# Patient Record
Sex: FEMALE
[De-identification: names, ages, dates, MRNs, and addresses within clinical notes are randomized per-mention and may not be internally consistent; named-entity substitution may affect disease eponyms.]

---

## 2017-06-10 ENCOUNTER — HOSPITAL ENCOUNTER (EMERGENCY)
Dept: HOSPITAL 14 - H.ER | Age: 72
Discharge: HOME | End: 2017-06-10
Payer: MEDICARE

## 2017-06-10 VITALS
SYSTOLIC BLOOD PRESSURE: 154 MMHG | HEART RATE: 70 BPM | TEMPERATURE: 98.3 F | RESPIRATION RATE: 20 BRPM | OXYGEN SATURATION: 99 % | DIASTOLIC BLOOD PRESSURE: 83 MMHG

## 2017-06-10 VITALS — BODY MASS INDEX: 33.1 KG/M2

## 2017-06-10 DIAGNOSIS — Z77.098: ICD-10-CM

## 2017-06-10 DIAGNOSIS — J44.9: ICD-10-CM

## 2017-06-10 DIAGNOSIS — J45.909: ICD-10-CM

## 2017-06-10 DIAGNOSIS — I10: ICD-10-CM

## 2017-06-10 LAB
ALBUMIN/GLOB SERPL: 1.7 {RATIO} (ref 1–2.1)
ALP SERPL-CCNC: 67 U/L (ref 38–126)
ALT SERPL-CCNC: 29 U/L (ref 9–52)
AST SERPL-CCNC: 26 U/L (ref 14–36)
BASOPHILS # BLD AUTO: 0.1 K/UL (ref 0–0.2)
BASOPHILS NFR BLD: 1 % (ref 0–2)
BILIRUB SERPL-MCNC: 0.5 MG/DL (ref 0.2–1.3)
BUN SERPL-MCNC: 19 MG/DL (ref 7–17)
CALCIUM SERPL-MCNC: 9.5 MG/DL (ref 8.4–10.2)
CHLORIDE SERPL-SCNC: 109 MMOL/L (ref 98–107)
CO2 SERPL-SCNC: 23 MMOL/L (ref 22–30)
EOSINOPHIL # BLD AUTO: 0.1 K/UL (ref 0–0.7)
EOSINOPHIL NFR BLD: 2.6 % (ref 0–4)
ERYTHROCYTE [DISTWIDTH] IN BLOOD BY AUTOMATED COUNT: 12.9 % (ref 11.5–14.5)
GLOBULIN SER-MCNC: 2.7 GM/DL (ref 2.2–3.9)
GLUCOSE SERPL-MCNC: 100 MG/DL (ref 65–105)
HCT VFR BLD CALC: 43 % (ref 34–47)
LYMPHOCYTES # BLD AUTO: 1.3 K/UL (ref 1–4.3)
LYMPHOCYTES NFR BLD AUTO: 23.6 % (ref 20–40)
MCH RBC QN AUTO: 27.5 PG (ref 27–31)
MCHC RBC AUTO-ENTMCNC: 32.7 G/DL (ref 33–37)
MCV RBC AUTO: 84.2 FL (ref 81–99)
MONOCYTES # BLD: 0.5 K/UL (ref 0–0.8)
MONOCYTES NFR BLD: 9.1 % (ref 0–10)
NEUTROPHILS # BLD: 3.4 K/UL (ref 1.8–7)
NEUTROPHILS NFR BLD AUTO: 63.7 % (ref 50–75)
NRBC BLD AUTO-RTO: 0.1 % (ref 0–0)
PLATELET # BLD: 217 K/UL (ref 130–400)
PMV BLD AUTO: 9.2 FL (ref 7.2–11.7)
POTASSIUM SERPL-SCNC: 4.8 MMOL/L (ref 3.6–5)
PROT SERPL-MCNC: 7.2 G/DL (ref 6.3–8.2)
SODIUM SERPL-SCNC: 143 MMOL/L (ref 132–148)
WBC # BLD AUTO: 5.4 K/UL (ref 4.8–10.8)

## 2017-06-10 PROCEDURE — 71020: CPT

## 2017-06-10 PROCEDURE — 80053 COMPREHEN METABOLIC PANEL: CPT

## 2017-06-10 PROCEDURE — 85025 COMPLETE CBC W/AUTO DIFF WBC: CPT

## 2017-06-10 PROCEDURE — 99285 EMERGENCY DEPT VISIT HI MDM: CPT

## 2017-06-10 PROCEDURE — 96375 TX/PRO/DX INJ NEW DRUG ADDON: CPT

## 2017-06-10 PROCEDURE — 96374 THER/PROPH/DIAG INJ IV PUSH: CPT

## 2017-06-10 NOTE — RAD
HISTORY:

shortness of breath  



COMPARISON:

03/31/2015.



TECHNIQUE:

Chest PA and lateral



FINDINGS:



LUNGS:

Hyperinflation, manifestations of COPD. No active pulmonary disease.



PLEURA:

No significant pleural effusion identified. No pneumothorax apparent.



CARDIOVASCULAR:

Cardiomegaly.  No evidence of acute, significant cardiovascular 

disease. Incidental Finding(s): Postoperative changes related to 

sternotomy. 



OSSEOUS STRUCTURES:

No significant abnormalities.



VISUALIZED UPPER ABDOMEN:

Normal.



OTHER FINDINGS:

None.



IMPRESSION:

No active disease. No significant interval change compared to the 

prior examination(s).
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
## 2017-06-29 ENCOUNTER — HOSPITAL ENCOUNTER (INPATIENT)
Dept: HOSPITAL 14 - H.ER | Age: 72
LOS: 2 days | Discharge: HOME | DRG: 204 | End: 2017-07-01
Attending: INTERNAL MEDICINE | Admitting: INTERNAL MEDICINE
Payer: MEDICARE

## 2017-06-29 VITALS — BODY MASS INDEX: 33.1 KG/M2

## 2017-06-29 DIAGNOSIS — R91.8: ICD-10-CM

## 2017-06-29 DIAGNOSIS — F41.9: ICD-10-CM

## 2017-06-29 DIAGNOSIS — M79.602: ICD-10-CM

## 2017-06-29 DIAGNOSIS — Z99.81: ICD-10-CM

## 2017-06-29 DIAGNOSIS — Z87.891: ICD-10-CM

## 2017-06-29 DIAGNOSIS — I10: ICD-10-CM

## 2017-06-29 DIAGNOSIS — I25.10: ICD-10-CM

## 2017-06-29 DIAGNOSIS — R19.7: ICD-10-CM

## 2017-06-29 DIAGNOSIS — J45.909: ICD-10-CM

## 2017-06-29 DIAGNOSIS — I69.398: ICD-10-CM

## 2017-06-29 DIAGNOSIS — Z95.1: ICD-10-CM

## 2017-06-29 DIAGNOSIS — M81.0: ICD-10-CM

## 2017-06-29 DIAGNOSIS — J44.9: ICD-10-CM

## 2017-06-29 DIAGNOSIS — E78.5: ICD-10-CM

## 2017-06-29 DIAGNOSIS — R06.09: Primary | ICD-10-CM

## 2017-06-29 DIAGNOSIS — Z88.0: ICD-10-CM

## 2017-06-29 DIAGNOSIS — Z87.01: ICD-10-CM

## 2017-06-29 DIAGNOSIS — Z79.82: ICD-10-CM

## 2017-06-29 DIAGNOSIS — G89.29: ICD-10-CM

## 2017-06-29 DIAGNOSIS — E78.00: ICD-10-CM

## 2017-06-29 DIAGNOSIS — M79.605: ICD-10-CM

## 2017-06-29 LAB
APTT BLD: 22.2 SECONDS (ref 25.6–37.1)
ARTERIAL BLOOD GAS O2 SAT: 98.3 % (ref 95–98)
ARTERIAL BLOOD GAS PCO2: 31 MM/HG (ref 35–45)
ARTERIAL BLOOD GAS TCO2: 19.3 MMOL/L (ref 22–28)
ARTERIAL PATENCY WRIST A: YES
BASOPHILS # BLD AUTO: 0 K/UL (ref 0–0.2)
BASOPHILS NFR BLD: 0.2 % (ref 0–2)
BNP SERPL-MCNC: 223 PG/ML (ref 0–900)
BUN SERPL-MCNC: 21 MG/DL (ref 7–17)
CALCIUM SERPL-MCNC: 9 MG/DL (ref 8.4–10.2)
EOSINOPHIL # BLD AUTO: 0 K/UL (ref 0–0.7)
EOSINOPHIL NFR BLD: 0.2 % (ref 0–4)
ERYTHROCYTE [DISTWIDTH] IN BLOOD BY AUTOMATED COUNT: 12.8 % (ref 11.5–14.5)
GFR NON-AFRICAN AMERICAN: > 60
HCO3 BLDA-SCNC: 20.4 MMOL/L (ref 21–28)
HGB BLD-MCNC: 16.6 G/DL (ref 12–16)
INHALED O2 CONCENTRATION: 100 %
INR PPP: 0.9 (ref 0.9–1.2)
LYMPHOCYTES # BLD AUTO: 0.6 K/UL (ref 1–4.3)
LYMPHOCYTES NFR BLD AUTO: 17.5 % (ref 20–40)
MCH RBC QN AUTO: 27.7 PG (ref 27–31)
MCHC RBC AUTO-ENTMCNC: 32.7 G/DL (ref 33–37)
MCV RBC AUTO: 84.7 FL (ref 81–99)
MONOCYTES # BLD: 0 K/UL (ref 0–0.8)
MONOCYTES NFR BLD: 1 % (ref 0–10)
NEUTROPHILS # BLD: 2.5 K/UL (ref 1.8–7)
NEUTROPHILS NFR BLD AUTO: 81.1 % (ref 50–75)
NRBC BLD AUTO-RTO: 0.2 % (ref 0–0)
PH BLDA: 7.38 [PH] (ref 7.35–7.45)
PLATELET # BLD: 172 K/UL (ref 130–400)
PMV BLD AUTO: 9.2 FL (ref 7.2–11.7)
PO2 BLDA: 80 MM/HG (ref 80–100)
PROTHROMBIN TIME: 10.6 SECONDS (ref 9.8–13.1)
RBC # BLD AUTO: 6.01 MIL/UL (ref 3.8–5.2)
WBC # BLD AUTO: 3.1 K/UL (ref 4.8–10.8)

## 2017-06-29 PROCEDURE — 5A09357 ASSISTANCE WITH RESPIRATORY VENTILATION, LESS THAN 24 CONSECUTIVE HOURS, CONTINUOUS POSITIVE AIRWAY PRESSURE: ICD-10-PCS | Performed by: INTERNAL MEDICINE

## 2017-06-29 NOTE — ED PDOC
HPI: SOB/CHF/COPD


Time Seen by Provider: 17 20:58


Chief Complaint (Nursing): GI Problem


Chief Complaint (Provider): Difficulty Breathing, Chest Pain, Vomiting, Diarrhea


History Per: Patient


History/Exam Limitations: no limitations


Onset/Duration Of Symptoms: Hrs (2 hours prior to arrival)


Current Symptoms Are (Timing): Still Present


Associated Symptoms: Chest Pain


Additional Complaint(s): 


Ange Roe, a 71 year old female, who has a past medical history of 

Coronary artery bypass grafting,  Chronic obstructive pulmonary disease (on 

oxygen at home) presents to the ED with vomiting, chest pain, diarrhea and 

difficulty breathing. The patient reports that her symptoms started 2 hours 

prior to arrival. She states that she believes she is having a reaction to 

hydrochlorothiazide. The patient also states that she is on a water pill but 

does not know how many milligrams.








Past Medical History


Reviewed: Historical Data, Nursing Documentation, Vital Signs


Vital Signs: 


 Last Vital Signs











Temp  98.7 F   17 20:44


 


Pulse  122 H  17 22:04


 


Resp  26 H  17 22:03


 


BP  98/63 L  17 22:03


 


Pulse Ox  100   17 22:22














- Medical History


PMH: Anxiety, Asthma, CAD, CHF, COPD, CVA (x3), Hiatal Hernia, HTN, 

Hypercholesterolemia, Osteoporosis, Pneumonia


   Denies: Chronic Kidney Disease


Other PMH: CABG





- Surgical History


Surgical History: Appendectomy, CABG (TRIPLE BYPASS. )





- Family History


Family History: States: Unknown Family Hx





- Immunization History


Hx Tetanus Toxoid Vaccination: No


Hx Influenza Vaccination: No


Hx Pneumococcal Vaccination: No





- Home Medications


Home Medications: 


 Ambulatory Orders











 Medication  Instructions  Recorded


 


Metoprolol Succinate [Toprol XL] 50 mg PO HS 14


 


ALPRAZolam [Xanax] 1 mg PO BID PRN 16


 


Aspirin [Ecotrin] 81 mg PO HS 16


 


Cholecalciferol 400 Intl Units 400 unit PO DAILY 16





[Vitamin D 400 Intl Units Tab]  


 


Moxifloxacin HCl [Avelox] 400 mg PO DAILY #7 tablet 16


 


Oxycodone HCl/Acetaminophen 1 tab PO Q6H PRN 16





[Percocet  mg Tablet]  


 


Potassium Chloride [K-Dur 20 mEq 20 meq PO DAILY 16





ER Tab]  


 


Rosuvastatin Calcium [Crestor] 10 mg PO HS 16


 


Vitamin E [Vitamin E 400 Units Cap] 400 unit PO DAILY 16


 


hydrOXYzine HCl [Atarax] 25 mg PO TID PRN #30 tab 06/10/17


 


predniSONE [predniSONE Tab] 40 mg PO DAILY #10 tab 06/10/17














- Allergies


Allergies/Adverse Reactions: 


 Allergies











Allergy/AdvReac Type Severity Reaction Status Date / Time


 


Penicillins AdvReac  ANGIOEDEMA Verified 17 20:44














Curb-65 Severity Score





- CURB-65 Severity Score


Confusion: No


Respiratory Rate greater than/equal to 30: Yes


Systolic BP <90 or Diastolic BP less than/equal 60mmHg: No


Age >64: Yes


Curb-65 Score: 2


Percentage 30-day mortality: 6.8%





Wells Criteria for PE





- Wells Criteria for Pulmonary Embolism


Clinical Signs and Symptoms of DVT: No


P.E is #1 Diagnosis, or Equally Likely: No


Heart Rate >100: No


Immobilization at least 3 days;Surgery previous 4 weeks: No


Previous, objectively diagnosed PE or DVT: No


Hemoptysis: No


Malignancy w/treatment within 6 months, or palliative: No


Total Score: 0





Review of Systems


ROS Statement: Except As Marked, All Systems Reviewed And Found Negative


Cardiovascular: Positive for: Chest Pain, Other (Difficulty breathing)


Gastrointestinal: Positive for: Vomiting, Diarrhea





Physical Exam





- Physical Exam


Appears: Positive for: Non-toxic, In Acute Distress


Skin: Positive for: Warm, Dry, Pallor.  Negative for: Normal Color (Skin 

appears ashen)


Eye Exam: Positive for: Normal appearance, EOMI, PERRL


ENT: Positive for: Normal ENT Inspection


Neck: Positive for: Normal, Painless ROM, Supple


Cardiovascular/Chest: Positive for: Regular Rate, Rhythm, Chest Non Tender.  

Negative for: Tachycardia


Respiratory: Positive for: Crackles (Crackles up to bases), Respiratory 

Distress.  Negative for: Wheezing


Gastrointestinal/Abdominal: Positive for: Normal Exam, Bowel Sounds, Soft.  

Negative for: Tenderness, Guarding, Rebound


Back: Positive for: Normal Inspection


Extremity: Positive for: Normal ROM, Other (2+ pitting edema bilaterally of 

lower extremities.).  Negative for: Tenderness, Deformity


Neurologic/Psych: Positive for: Alert, Oriented, Gait





- Laboratory Results


Result Diagrams: 


 17 21:15





 17 21:15





- ECG


O2 Sat by Pulse Oximetry: 100 (RA)


Pulse Ox Interpretation: Normal





- Critical Care


Total Time (In Min): 60





Medical Decision Making


Medical Decision Makin:58 Initial Impression: 71 year old female presenting with CHF exacerbation 

vs COPD





Initial Plan:


*  ABG shock panel


* EKG, B-type natrieuretic


* Basic metabolic panel


* Troponin 1


* EKG


* CBC


* Prothrombopalstin


* Prothrombin time


* CXR


* Lasix 60mg IVP


* Duoneb 3mg/0.5 mg INH


* Zofran 4mg


* Nitroglycerin 50mg in 250 ml IV 5 mcg/min


* Peak flow pre/post TX .pre/post treatment


* BiPAP procedure


* Reevaluation





1030: Pt. improving on BIPAP and with lasix.  Color has returned to face.  

Nitro stopped due to low BP.  Dr. Burnette aware of patient, accepts admission.  

Dr. Barton (hospitalist) will evaluate patient for ICU


_________________________________________________


Scribe Attestation


Documented by Stacy Cheung acting as a scribe for Bay Camacho MD.





Provider Attestation:


All medical record entries made by the Scribe were at my direction and 

personally dictated by me. I have reviewed the chart and agree that the record 

accurately reflects my personal performance of the history, physical exam, 

medical decision making, and the department course for this patient. I have 

also personally directed, reviewed, and agree with the discharge instructions 

and disposition








Disposition





- Clinical Impression


Clinical Impression: 


 Respiratory distress








- Patient ED Disposition


Is Patient to be Admitted: Yes





- Disposition


Disposition Time: 22:30


Condition: CRITICAL

## 2017-06-30 VITALS — RESPIRATION RATE: 20 BRPM

## 2017-06-30 LAB
ALBUMIN SERPL-MCNC: 3.2 G/DL (ref 3.5–5)
ALBUMIN/GLOB SERPL: 1.3 {RATIO} (ref 1–2.1)
ALT SERPL-CCNC: 28 U/L (ref 9–52)
ANISOCYTOSIS BLD QL SMEAR: SLIGHT
AST SERPL-CCNC: 30 U/L (ref 14–36)
BACTERIA #/AREA URNS HPF: (no result) /[HPF]
BASOPHILS # BLD AUTO: 0 K/UL (ref 0–0.2)
BASOPHILS NFR BLD: 0.2 % (ref 0–2)
BILIRUB UR-MCNC: NEGATIVE MG/DL
BUN SERPL-MCNC: 25 MG/DL (ref 7–17)
CALCIUM SERPL-MCNC: 8.7 MG/DL (ref 8.4–10.2)
COLOR UR: YELLOW
EOSINOPHIL # BLD AUTO: 0 K/UL (ref 0–0.7)
EOSINOPHIL NFR BLD: 0.1 % (ref 0–4)
ERYTHROCYTE [DISTWIDTH] IN BLOOD BY AUTOMATED COUNT: 12.8 % (ref 11.5–14.5)
GFR NON-AFRICAN AMERICAN: 44
GIANT PLATELETS BLD QL SMEAR: PRESENT
GLUCOSE UR STRIP-MCNC: (no result) MG/DL
HDLC SERPL-MCNC: 36 MG/DL (ref 30–70)
HGB BLD-MCNC: 14.3 G/DL (ref 12–16)
LDLC SERPL-MCNC: 57 MG/DL (ref 0–129)
LEUKOCYTE ESTERASE UR-ACNC: (no result) LEU/UL
LG PLATELETS BLD QL SMEAR: PRESENT
LYMPHOCYTE: 1 % (ref 20–50)
LYMPHOCYTES # BLD AUTO: 0.2 K/UL (ref 1–4.3)
LYMPHOCYTES NFR BLD AUTO: 1.6 % (ref 20–40)
MAGNESIUM SERPL-MCNC: 1.9 MG/DL (ref 1.6–2.3)
MCH RBC QN AUTO: 27.6 PG (ref 27–31)
MCHC RBC AUTO-ENTMCNC: 32.6 G/DL (ref 33–37)
MCV RBC AUTO: 84.6 FL (ref 81–99)
MONOCYTES # BLD: 0.5 K/UL (ref 0–0.8)
MONOCYTES NFR BLD: 4.9 % (ref 0–10)
NEUTROPHILS # BLD: 9.4 K/UL (ref 1.8–7)
NEUTROPHILS NFR BLD AUTO: 83 % (ref 42–75)
NEUTROPHILS NFR BLD AUTO: 93.2 % (ref 50–75)
NEUTS BAND NFR BLD: 16 % (ref 0–2)
NRBC BLD AUTO-RTO: 0.1 % (ref 0–0)
PH UR STRIP: 6 [PH] (ref 5–8)
PLATELET # BLD EST: NORMAL 10*3/UL
PLATELET # BLD: 152 K/UL (ref 130–400)
PMV BLD AUTO: 9.2 FL (ref 7.2–11.7)
PROT UR STRIP-MCNC: NEGATIVE MG/DL
RBC # BLD AUTO: 5.18 MIL/UL (ref 3.8–5.2)
RBC # UR STRIP: (no result) /UL
SP GR UR STRIP: 1.01 (ref 1–1.03)
SQUAMOUS EPITHIAL: 2 /HPF (ref 0–5)
T4 SERPL-MCNC: 5.41 UG/DL (ref 5.5–11)
TOTAL CELLS COUNTED BLD: 100
URINE CLARITY: (no result)
URINE HYALINE CAST: (no result) /HPF (ref 0–2)
URINE NITRATE: POSITIVE
UROBILINOGEN UR-MCNC: (no result) MG/DL (ref 0.2–1)
WBC # BLD AUTO: 10.1 K/UL (ref 4.8–10.8)

## 2017-06-30 RX ADMIN — METHYLPREDNISOLONE SODIUM SUCCINATE SCH MLS/HR: 125 INJECTION, POWDER, FOR SOLUTION INTRAMUSCULAR; INTRAVENOUS at 14:12

## 2017-06-30 RX ADMIN — Medication SCH MG: at 08:27

## 2017-06-30 RX ADMIN — METHYLPREDNISOLONE SODIUM SUCCINATE SCH MLS/HR: 125 INJECTION, POWDER, FOR SOLUTION INTRAMUSCULAR; INTRAVENOUS at 21:49

## 2017-06-30 RX ADMIN — Medication SCH MG: at 01:46

## 2017-06-30 RX ADMIN — Medication SCH INTLU: at 08:30

## 2017-06-30 RX ADMIN — METOPROLOL SUCCINATE SCH MG: 50 TABLET, EXTENDED RELEASE ORAL at 22:05

## 2017-06-30 RX ADMIN — METHYLPREDNISOLONE SODIUM SUCCINATE SCH MLS/HR: 125 INJECTION, POWDER, FOR SOLUTION INTRAMUSCULAR; INTRAVENOUS at 05:28

## 2017-06-30 RX ADMIN — ENOXAPARIN SODIUM SCH MG: 40 INJECTION SUBCUTANEOUS at 08:28

## 2017-06-30 RX ADMIN — Medication SCH MG: at 16:12

## 2017-06-30 RX ADMIN — METOPROLOL SUCCINATE SCH MG: 50 TABLET, EXTENDED RELEASE ORAL at 00:13

## 2017-06-30 NOTE — CARD
--------------- APPROVED REPORT --------------





EKG Measurement

Heart [id number]

NC 124P68

[id number]

[id number]

[id number]



<Conclusion>

Sinus tachycardia

Possible Left atrial enlargement

Right bundle branch block

Septal infarct, age undetermined

Inferior infarct, age undetermined

Abnormal ECG

## 2017-06-30 NOTE — US
PROCEDURE:  Bilateral lower extremity venous duplex Doppler. 



HISTORY:

Worsening bilateral lower leg edema



COMPARISON:

None available. 



TECHNIQUE:

Bilateral common femoral, superficial femoral, popliteal and 

posterior tibial veins were evaluated. Flow was assessed with color 

Doppler, compressibility, assessment of phasic flow and augmentation 

response. 



FINDINGS:



COMMON FEMORAL VEIN:

Right CFV:  Unremarkable.



Left CFV:  Unremarkable.



SUPERFICIAL FEMORAL VEIN:

Right SFV: Unremarkable.



Left SFV:  Unremarkable.



POPLITEAL VEIN:

Right Popliteal:  Unremarkable.



Left Popliteal:  Unremarkable.



POSTERIOR TIBIAL VEIN:

Right PTV: Unremarkable.



Left PTV: Unremarkable.



OTHER FINDINGS:

None.



IMPRESSION:

No evidence of deep venous thrombosis.

## 2017-06-30 NOTE — CT
EXAM:

  CT Head Without Intravenous Contrast



CLINICAL HISTORY:

  71 years old, female; Injury or trauma; Fall; Initial encounter; Blunt trauma 

(contusions or hematomas)



TECHNIQUE:

  Axial computed tomography images of the head/brain without intravenous 

contrast.  This CT exam was performed using one or more of the following dose 

reduction techniques:  automated exposure control, adjustment of the mA and/or 

kV according to patient size, and/or use of iterative reconstruction technique.

  Coronal and sagittal reformatted images were created and reviewed.



EXAM DATE/TIME:

  6/29/2017 11:48 PM



COMPARISON:

  There are no prior studies for comparison.



FINDINGS:

  Brain:  Ventricles are normal in size and configuration. There is no midline 

shift. There are no intra-axial or extra-axial mass lesions or areas of 

hemorrhage. There are no abnormal fluid collections. Gray-white differentiation 

is maintained.

  Ventricles:  See above.

   Bones: Cranial vault is intact.

  Soft tissues:  unremarkable

  Sinuses:  There are air-fluid levels in the sphenoid sinus

   Ears and mastoids: Middle ears and mastoids are unremarkable

  Orbits:  Orbital contents are unremarkable.



IMPRESSION:  No acute intracranial abnormality

## 2017-06-30 NOTE — CP.PCM.HP
<Gela Arzola - Last Filed: 07/16/17 02:55>





History of Present Illness





- History of Present Illness


History of Present Illness: 





71F p/w fall. Pt reports recent medication change of HCTZ and she took for the 

first time at 6 PM 6/29/17. Shortly afterwards she became dizzy/lightheaded 

associated with SOB, and then "fell".  This was described as sliding onto her 

right side, she does not recall if she hit her head or LOC. She then became 

nauseous and vomited a couple of times along with a small amount of diarrhea as 

well. Her brother, who lives with her, tried to help but was unable to do so. 

She was found to be in respiratory distress upon her arrival to the ER. 





PMHx: HTN, HLD, COPD, Pneumonia, CVA (with chronic left arm and left leg pain), 

Osteoporosis, Anxiety


PSHx: CABG (2009), Appendectomy, Partial Hysterectomy


ALL: PCN, IV Contrast





Present on Admission





- Present on Admission


Any Indicators Present on Admission: No





Review of Systems





- Review of Systems


All systems: reviewed and no additional remarkable complaints except





- Respiratory


Respiratory: Dyspnea





- Gastrointestinal


Gastrointestinal: Diarrhea, Nausea, Vomiting





Past Patient History





- Infectious Disease


Hx of Infectious Diseases: None





- Past Medical History & Family History


Past Medical History?: Yes





- Past Social History


Smoking Status: Former Smoker





- CARDIAC


Hx Congestive Heart Failure: Yes


Hx Hypercholesterolemia: Yes


Hx Hypertension: Yes





- PULMONARY


Hx Asthma: Yes


Hx Chronic Obstructive Pulmonary Disease (COPD): Yes


Hx Pneumonia: Yes





- NEUROLOGICAL


Hx Neurological Disorder: Yes


HX Cerebrovascular Accident: Yes (x3)





- HEENT


Hx HEENT Problems: No





- RENAL


Hx Chronic Kidney Disease: No





- ENDOCRINE/METABOLIC


Hx Endocrine Disorders: No





- HEMATOLOGICAL/ONCOLOGICAL


Hx Blood Disorders: No





- INTEGUMENTARY


Hx Dermatological Problems: No





- MUSCULOSKELETAL/RHEUMATOLOGICAL


Hx Osteoporosis: Yes





- GASTROINTESTINAL


Hx Gastrointestinal Disorders: No





- GENITOURINARY/GYNECOLOGICAL


Hx Genitourinary Disorders: No





- PSYCHIATRIC


Hx Anxiety: Yes





- SURGICAL HISTORY


Hx Appendectomy: Yes


Hx Coronary Artery Bypass Graft: Yes (TRIPLE BYPASS. 2009)





- ANESTHESIA


Hx Anesthesia: Yes


Hx Anesthesia Reactions: No


Hx Malignant Hyperthermia: No





Meds


Allergies/Adverse Reactions: 


 Allergies











Allergy/AdvReac Type Severity Reaction Status Date / Time


 


Penicillins AdvReac  ANGIOEDEMA Verified 06/29/17 20:44














Physical Exam





- Constitutional


Appears: Non-toxic, No Acute Distress





- Head Exam


Head Exam: ATRAUMATIC, NORMAL INSPECTION





- Eye Exam


Eye Exam: EOMI, PERRL





- ENT Exam


ENT Exam: Mucous Membranes Moist, Normal Exam





- Respiratory Exam


Respiratory Exam: Rales, NORMAL BREATHING PATTERN





- Cardiovascular Exam


Cardiovascular Exam: REGULAR RHYTHM.  absent: JVD





- GI/Abdominal Exam


GI & Abdominal Exam: Normal Bowel Sounds, Soft.  absent: Tenderness





- Extremities Exam


Extremities exam: Positive for: normal capillary refill, pedal edema, pedal 

pulses present





- Neurological Exam


Neurological exam: Alert, Oriented x3





- Psychiatric Exam


Psychiatric exam: Normal Affect, Normal Mood





- Skin


Skin Exam: Normal Color, Warm





Results





- Vital Signs


Recent Vital Signs: 





 Last Vital Signs











Temp  37.1 C   06/30/17 08:00


 


Pulse  85   06/30/17 08:00


 


Resp  16   06/30/17 08:00


 


BP  121/77   06/30/17 08:00


 


Pulse Ox  96   06/30/17 08:00














- Labs


Result Diagrams: 


 06/30/17 07:20





 06/30/17 07:20


Labs: 





 Laboratory Results - last 24 hr











  06/30/17 06/30/17 06/30/17





  00:10 02:32 07:20


 


WBC    10.1  D


 


RBC    5.18


 


Hgb    14.3  D


 


Hct    43.8


 


MCV    84.6


 


MCH    27.6


 


MCHC    32.6 L


 


RDW    12.8


 


Plt Count    152


 


MPV    9.2


 


Neut % (Auto)    93.2 H


 


Lymph % (Auto)    1.6 L


 


Mono % (Auto)    4.9


 


Eos % (Auto)    0.1


 


Baso % (Auto)    0.2


 


Neut #    9.4 H


 


Lymph #    0.2 L


 


Mono #    0.5


 


Eos #    0.0


 


Baso #    0.0


 


Sodium   


 


Potassium   


 


Chloride   


 


Carbon Dioxide   


 


Anion Gap   


 


BUN   


 


Creatinine   


 


Est GFR ( Amer)   


 


Est GFR (Non-Af Amer)   


 


Random Glucose   


 


Calcium   


 


Phosphorus   


 


Magnesium   


 


Total Bilirubin   


 


AST   


 


ALT   


 


Alkaline Phosphatase   


 


Total Protein   


 


Albumin   


 


Globulin   


 


Albumin/Globulin Ratio   


 


Triglycerides   


 


Cholesterol   


 


LDL Cholesterol Direct   


 


HDL Cholesterol   


 


Thyroxine (T4)   


 


TSH 3rd Generation   


 


Urine Color  Yellow  


 


Urine Clarity  Slighty-cloudy  


 


Urine pH  6.0  


 


Ur Specific Gravity  1.008  


 


Urine Protein  Negative  


 


Urine Glucose (UA)  Neg  


 


Urine Ketones  Negative  


 


Urine Blood  Large  


 


Urine Nitrate  Positive H  


 


Urine Bilirubin  Negative  


 


Urine Urobilinogen  0.2-1.0  


 


Ur Leukocyte Esterase  Small  


 


Urine RBC (Auto)  152 H  


 


Urine Microscopic WBC  9 H  


 


Ur Squamous Epith Cells  2  


 


Urine Bacteria  Mod H  


 


Hyaline Casts  3-5 H  


 


Influenza Typ A,B (EIA)   Negative for flu a/b 














  06/30/17





  07:20


 


WBC 


 


RBC 


 


Hgb 


 


Hct 


 


MCV 


 


MCH 


 


MCHC 


 


RDW 


 


Plt Count 


 


MPV 


 


Neut % (Auto) 


 


Lymph % (Auto) 


 


Mono % (Auto) 


 


Eos % (Auto) 


 


Baso % (Auto) 


 


Neut # 


 


Lymph # 


 


Mono # 


 


Eos # 


 


Baso # 


 


Sodium  137


 


Potassium  4.0


 


Chloride  108 H


 


Carbon Dioxide  20 L


 


Anion Gap  13


 


BUN  25 H


 


Creatinine  1.2


 


Est GFR ( Amer)  54


 


Est GFR (Non-Af Amer)  44


 


Random Glucose  185 H


 


Calcium  8.7


 


Phosphorus  4.0


 


Magnesium  1.9


 


Total Bilirubin  0.4


 


AST  30


 


ALT  28


 


Alkaline Phosphatase  57


 


Total Protein  5.6 L


 


Albumin  3.2 L D


 


Globulin  2.4


 


Albumin/Globulin Ratio  1.3


 


Triglycerides  196 H


 


Cholesterol  138


 


LDL Cholesterol Direct  57


 


HDL Cholesterol  36


 


Thyroxine (T4)  5.41 L


 


TSH 3rd Generation  1.54


 


Urine Color 


 


Urine Clarity 


 


Urine pH 


 


Ur Specific Gravity 


 


Urine Protein 


 


Urine Glucose (UA) 


 


Urine Ketones 


 


Urine Blood 


 


Urine Nitrate 


 


Urine Bilirubin 


 


Urine Urobilinogen 


 


Ur Leukocyte Esterase 


 


Urine RBC (Auto) 


 


Urine Microscopic WBC 


 


Ur Squamous Epith Cells 


 


Urine Bacteria 


 


Hyaline Casts 


 


Influenza Typ A,B (EIA) 














Assessment & Plan





- Assessment and Plan (Free Text)


Assessment: 





71F with multiple co-morbidities admitted for respiratory distress, CT head 

negative, CXR showing venous congestion with echo showing preserved systolic 

function.  


- Resume Home medications


- Diet, DVT Prophylaxis, EKG


- BNP, CBC, Troponins, CMP


- Steroids


- Echo, CT head, ABD Ultrasound, Urinalysis





<Burnette,Severo K - Last Filed: 07/20/17 16:35>





Results





- Vital Signs


Recent Vital Signs: 





 Last Vital Signs











Temp  98.3 F   07/01/17 08:35


 


Pulse  86   07/01/17 08:35


 


Resp  20   07/01/17 08:35


 


BP  142/82   07/01/17 08:35


 


Pulse Ox  92 L  07/01/17 08:35














- Labs


Result Diagrams: 


 06/30/17 07:20





 06/30/17 07:20





Assessment & Plan





- Assessment and Plan (Free Text)


Assessment: 


Patient was personally seen and examined by me in rounds with residents.


Available labs and diagnostic data reviewed.


Case, patient's condition and management plan discussed with residents in 

rounds.


Agree with resident's progress note.


Plan: As ordered.

## 2017-06-30 NOTE — RAD
PROCEDURE:  CHEST RADIOGRAPH, 1 VIEW



HISTORY:

desaturating, moist crackles



COMPARISON:

06/10/2017



FINDINGS:



LUNGS:

There is worsening pulmonary venous congestion with pulmonary 

redistribution. There is also interstitial pulmonary edema. No lobar 

pneumonia.



PLEURA:

No pneumothorax or pleural fluid seen.



CARDIOVASCULAR:

The heart is enlarged.  Status post CABG.



OSSEOUS STRUCTURES:

No significant abnormalities.



VISUALIZED UPPER ABDOMEN:

Normal.



OTHER FINDINGS:

None. 



IMPRESSION:

Worsening pulmonary venous congestion and interval development of 

interstitial pulmonary edema. No lobar pneumonia.

## 2017-06-30 NOTE — CARD
--------------- APPROVED REPORT --------------





EXAM: Two-dimensional and M-mode echocardiogram with Doppler and 

color Doppler.



Other Information 

Quality : GoodRhythm : NSR



INDICATION

Cardiomegaly



Surgery/Intervention

CABD DIMENSIONS 

IVSd1.20   (0.7-1.1cm)LVDd4.11   (3.9-5.9cm)

LVOT Diameter2.05   (1.8-2.4cm)PWd1.23   (0.7-1.1cm)

IVSs1.58   (0.8-1.2cm)LVDs2.89   (2.5-4.0cm)

FS (%) 29.6   %PWs1.30   (0.8-1.2cm)



M-Mode DIMENSIONS 

Left Atrium (MM)3.44   (2.5-4.0cm)IVSd1.09   (0.7-1.1cm)

Aortic Root3.38   (2.2-3.7cm)LVDd5.06   (4.0-5.6cm)

Aortic Cusp Exc.2.16   (1.5-2.0cm)PWd0.91   (0.7-1.1cm)

IVSs1.47   cmFS (%) 49   %

LVDs2.56   (2.0-3.8cm)PWs1.78   cm



Mitral Valve

MV E Apydyung03.0cm/sMV DECEL [id number] A Jnmxerhx77.5cm/s

MV MSO61eeG/A ratio0.9MVA (PHT)3.42cm2



TDI

Lateral E' Peak V4.32cm/sMedial E' Peak V3.75cm/sE/Lateral E'13.0

E/Medial E'14.9



Pulmonary Valve

PV Peak Danycnrx394.0cm/s



 LEFT VENTRICLE 

The left ventricle is normal size.

There is mild concentric left ventricular hypertrophy.

The left ventricular function is normal.

The left ventricular ejection fraction is  60%

There is normal LV segmental wall motion.

Transmitral Doppler flow pattern is Grade I-abnormal relaxation 

pattern.

No left ventricle thrombus noted on this study.

There is no ventricular septal defect visualized.

There is no left ventricular aneurysm.

There is no mass noted in the left ventricle.



 RIGHT VENTRICLE 

The right ventricle is normal size.

There is normal right ventricular wall thickness.

The right ventricular systolic function is normal.



 ATRIA 

The left atrium size is normal.

The right atrium size is normal.

The interatrial septum is intact with no evidence for an atrial 

septal defect.



 AORTIC VALVE 

The aortic valve is normal in structure and function.

No aortic regurgitation is present.

There is no aortic valvular stenosis.

There is no aortic valvular vegetation.



 MITRAL VALVE 

The mitral valve is normal in structure and function.

There is no evidence of mitral valve prolapse.

There is no mitral valve stenosis.

There is no mitral valve regurgitation noted.



 TRICUSPID VALVE 

The tricuspid valve is normal in structure and function.

There is no tricuspid valve regurgitation noted.

There is no tricuspid valve prolapse or vegetation.

There is no tricuspid valve stenosis.



 PULMONIC VALVE 

The pulmonary valve is normal in structure and function.

There is no pulmonic valvular regurgitation.

There is no pulmonic valvular stenosis.



 GREAT VESSELS 

The aortic root is normal in size.

The ascending aorta is normal in size.

The IVC is normal in size and collapses >50% with inspiration.



 PERICARDIAL EFFUSION 

The pericardium appears normal.

There is no pleural effusion.



<Conclusion>

Normal LV Systolic Function

Mild Concentric LVH

## 2017-06-30 NOTE — CP.PCM.HP
History of Present Illness





- History of Present Illness


History of Present Illness: 





Hospitalist ICU Consult Note (Patient was seen and examined at 11:15 PM 6/29/17 

ER Bed 12)





71 year old female who presents to [facility] ER with a chief complaint of fall. 

Patient states that her former physician Dr. Escobar prescribed her HCTZ (for an 

unsepcified reason) and she took this for the first time at 6 PM 6/29/17. 

Shortly afterwards patient states that she became dizzy/lightheaded along with 

shortness of breath which lead to a fall. This fall is described by the patient 

as sliding onto her right side but the patient can't remember if she hit her 

head or not and can not recall if she lost consciousness (but then stated that 

she stated that she remembered everything that happened afterwards). Once on 

the floor, she became nausesous and stated that she vomited a couple of times 

and then also had a small amount of diarrhea as well. Her brother who lives 

with her tried to help her onto a nearby chair but was unable to do so. 

Therefore he called EMS and patient was brought here. She was found to be in 

respirtory distress upon her arrival to the ER. She was given Solumedrol 125 mg 

IV, Lasix 60 mg IV, Nitroglycerin, Duoneb x 2, placed on BiPAP after which she 

states that her breathing improved.





Uppon FULL ROS, NO chest pain (but some upper substernal nonradiating burning 

earlier after vomiting), NO palpitations, (+) SOB but this is better since the 

ER treatment described above, NO dysphagia/odynophagia, (+) Epigastric and RUQ 

abdominal pain, N/V/D as described above, Lightheadedness/dizzines as described 

above, (+) Blurriness of vision (present for some time now), NO changes in 

hearing/ear pain, (+) Paresthesias described as a numbness that comes and goes 

in the bilateral feet (chronic issue), (+) Edema in the bilateral lower legs (

chronic issue but states has been worsening over the course past few days





PMHx: HTN, HLD, COPD, Pneumonia, CVA (with chronic left arm and left leg pain), 

Osteoporosis, Anxiety


PSHx: CABG (2009), Appendectomy, Partial Hysterectomy


ALL: PCN, IV Contrast


Medications: Crestor 10 mg PO QHS, KDur 20 mEQ PO 1x/day, Metoprolol Succinate 

50 mg PO QHS, Cholecalciferal/Vit D 400 mg/400 U PO 1x/day, Xanax 1 mg PO 2x/

day PRN Anxiety, Endocet  mg PO 4x/day, Loperamide 2 mg PO 1x/day (

patient stated that she was taking this as needed but when asked when the 

diarrhea started she stated just today after taking HCTZ)


Social Hx: (+) Tobacco 2ppd for many years having quit some time ago, NO alcohol

, NO Illicit Drugs


Family Hx: Dad ("Heart Trouble"), Mom (Breast CA)











Past Patient History





- Infectious Disease


Hx of Infectious Diseases: None





- Past Medical History & Family History


Past Medical History?: Yes





- Past Social History


Smoking Status: Former Smoker





- CARDIAC


Hx Congestive Heart Failure: Yes


Hx Hypercholesterolemia: Yes


Hx Hypertension: Yes





- PULMONARY


Hx Asthma: Yes


Hx Chronic Obstructive Pulmonary Disease (COPD): Yes


Hx Pneumonia: Yes





- NEUROLOGICAL


Hx Neurological Disorder: Yes


HX Cerebrovascular Accident: Yes (x3)





- HEENT


Hx HEENT Problems: No





- RENAL


Hx Chronic Kidney Disease: No





- ENDOCRINE/METABOLIC


Hx Endocrine Disorders: No





- HEMATOLOGICAL/ONCOLOGICAL


Hx Blood Disorders: No





- INTEGUMENTARY


Hx Dermatological Problems: No





- MUSCULOSKELETAL/RHEUMATOLOGICAL


Hx Osteoporosis: Yes





- GASTROINTESTINAL


Hx Gastrointestinal Disorders: No





- GENITOURINARY/GYNECOLOGICAL


Hx Genitourinary Disorders: No





- PSYCHIATRIC


Hx Anxiety: Yes





- SURGICAL HISTORY


Hx Appendectomy: Yes


Hx Coronary Artery Bypass Graft: Yes (TRIPLE BYPASS. 2009)





- ANESTHESIA


Hx Anesthesia: Yes


Hx Anesthesia Reactions: No


Hx Malignant Hyperthermia: No





Meds


Allergies/Adverse Reactions: 


 Allergies











Allergy/AdvReac Type Severity Reaction Status Date / Time


 


Penicillins AdvReac  ANGIOEDEMA Verified 06/29/17 20:44














Results





- Vital Signs


Recent Vital Signs: 





 Last Vital Signs











Temp  98.8 F   06/30/17 00:22


 


Pulse  118 H  06/30/17 00:22


 


Resp  14   06/30/17 00:22


 


BP  94/57 L  06/30/17 00:22


 


Pulse Ox  96   06/30/17 00:22














- Labs


Result Diagrams: 


 06/29/17 21:15





 06/29/17 21:15


Labs: 





 Laboratory Results - last 24 hr











  06/30/17





  00:10


 


Urine Color  Yellow


 


Urine Clarity  Slighty-cloudy


 


Urine pH  6.0


 


Ur Specific Gravity  1.008


 


Urine Protein  Negative


 


Urine Glucose (UA)  Neg


 


Urine Ketones  Negative


 


Urine Blood  Large


 


Urine Nitrate  Positive H


 


Urine Bilirubin  Negative


 


Urine Urobilinogen  0.2-1.0


 


Ur Leukocyte Esterase  Small


 


Urine RBC (Auto)  152 H


 


Urine Microscopic WBC  9 H


 


Ur Squamous Epith Cells  2


 


Urine Bacteria  Mod H


 


Hyaline Casts  3-5 H

## 2017-06-30 NOTE — US
HISTORY:

Distended Gallbladder with distended CBD



COMPARISON:

None.



TECHNIQUE:

Sonographic evaluation of the right upper quadrant of the abdomen.



FINDINGS:



LIVER:

Measures 15.0 cm in length. There is diffuse increased echogenicity 

of the liver parenchyma.  No mass. No intrahepatic bile duct 

dilatation. 



GALLBLADDER:

The gallbladder is distended. No evidence of gallstones, wall 

thickening or pericholecystic fluid.  The sonographic Garcia's sign 

is negative. 



COMMON BILE DUCT:

Measures 6.2 mm. There is mild dilatation of the common bile duct 

without choledocholithiasis. 



PANCREAS:

Unremarkable as visualized. No mass. No ductal dilatation.



RIGHT KIDNEY:

Measures 12.3 cm in length. Normal echogenicity. No calculus, mass, 

or hydronephrosis. There is a 4.8 x 3.3 x 3.8 cm simple cyst in the 

upper pole and 10 x 9 x 9 mm simple cyst in the interpolar region. 



AORTA:

No aneurysmal dilatation.



IVC:

Unremarkable.



OTHER FINDINGS:

None .



IMPRESSION:

No evidence of cholelithiasis. Mild dilatation of the common bile 

duct without sonographic evidence for choledocholithiasis.  



Diffuse increased echogenicity in the liver may reflect hepatic 

steatosis however parenchymal infectious/ inflammatory etiologies 

cannot be entirely excluded.  Clinical and laboratory correlation is 

advised..

## 2017-06-30 NOTE — CP.PCM.CON
History of Present Illness





- History of Present Illness


History of Present Illness: 





Hospitalist ICU Consult Note (Patient was seen and examined at 11:15 PM 6/29/17 

ER Bed 12)





71 year old female who presents to [facility] ER with a chief complaint of fall. 

Patient states that her former physician Dr. Escobar prescribed her HCTZ (for an 

unsepcified reason) and she took this for the first time at 6 PM 6/29/17. 

Shortly afterwards patient states that she became dizzy/lightheaded along with 

shortness of breath which lead to a fall. This fall is described by the patient 

as sliding onto her right side but the patient can't remember if she hit her 

head or not and can not recall if she lost consciousness (but then stated that 

she stated that she remembered everything that happened afterwards). Once on 

the floor, she became nausesous and stated that she vomited a couple of times 

and then also had a small amount of diarrhea as well. Her brother who lives 

with her tried to help her onto a nearby chair but was unable to do so. 

Therefore he called EMS and patient was brought here. She was found to be in 

respirtory distress upon her arrival to the ER. She was given Solumedrol 125 mg 

IV, Lasix 60 mg IV, Nitroglycerin, Duoneb x 2, placed on BiPAP after which she 

states that her breathing improved.





Uppon FULL ROS, NO chest pain (but some upper substernal nonradiating burning 

earlier after vomiting), NO palpitations, (+) SOB but this is better since the 

ER treatment described above, NO dysphagia/odynophagia, (+) Epigastric and RUQ 

abdominal pain, N/V/D as described above, Lightheadedness/dizzines as described 

above, (+) Blurriness of vision (present for some time now), NO changes in 

hearing/ear pain, (+) Paresthesias described as a numbness that comes and goes 

in the bilateral feet (chronic issue), (+) Edema in the bilateral lower legs (

chronic issue but states has been worsening over the course past few days





PMHx: HTN, HLD, COPD, Pneumonia, CVA (with chronic left arm and left leg pain), 

Osteoporosis, Anxiety


PSHx: CABG (2009), Appendectomy, Partial Hysterectomy


ALL: PCN, IV Contrast


Medications: Crestor 10 mg PO QHS, KDur 20 mEQ PO 1x/day, Metoprolol Succinate 

50 mg PO QHS, Cholecalciferal/Vit D 400 mg/400 U PO 1x/day, Xanax 1 mg PO 2x/

day PRN Anxiety, Endocet  mg PO 4x/day, Loperamide 2 mg PO 1x/day (

patient stated that she was taking this as needed but when asked when the 

diarrhea started she stated just today after taking HCTZ)


Social Hx: (+) Tobacco 2ppd for many years having quit some time ago, NO alcohol

, NO Illicit Drugs


Family Hx: Dad ("Heart Trouble"), Mom (Breast CA)





Exam:


General: AAOx3, Breathing comfortably on BiPAP


HEENT: NCA, EOMI, PERRLA, NO cervical/supraclavicular/submandibular 

lymphadenopathy, NO pharyngeal erythema/exudate, NO thyromegaly


Cardio: NS1 and NS2, NO M/R/G (limited by noise in the ER and BiPAP machine)


Respiratory: Bilateral Lower Lobe Inspiratory Crackles


GI: BSx4 decreased, RUQ and Epigastric pain to palpation but no guarding/

rebound tenderness, NO HSM, Soft


Ext: NONpitting edema from ankles to the knees, Pulses are strong and equal, 

Capillary Refill is 2 seconds


Neuro: CN II through XII are grossly intact





Assessment and Plan:





1). Acute Respiratory Distress


Could this be secondary COPD Exacerbation vs. Pneumonia vs. Endocet use?


Chest X Ray done in the ER shows increased pulmonary vascular markings/

congestion as well as possible right lower lobe infiltrate/consolidation when 

compared to the Chest X Ray from 6/10/17


ProBNP is within normal limits and as per ER Physician conversation with PMD, 

there is NO history of Heart Failure: F/U 2D Echocardiogram


Patient is not on any maintenance medication for COPD: recommend Pulmonology 

consultation


Rocephin 1 gm IV 1x/day


Azithromycin 500 mg IV 1x/day


Solumedrol 60 mg IV Q8H


Duoneb Q6H PRN SOB/Wheezing


F/U Blood Culture


F/U Urine Culture


F/U Urine Legionella


F/U Urine S. pneumoniae


F/U Mycoplasma IgM/IgG


F/U Rapid Influenza





2). Possible RLL Infiltrate


See Assessment and Plan #1





3). Abdominal Pain


F/U STAT CT Abdomen/Pelvis


Zofran 4 mg IV Q6H PRN N/V





4). S/P Fall 


F/U STAT CT Head


F/U Hip/Pelvis X Ray (patient had tenderness to palpation of the right hip area

) if Hip/Pelvis are not able to be see on CT Abdomen/Pelvis





5). Diarrhea


F/U Stool Culture


F/U Stool O&P


F/U C. diff Toxin





6). Hx CAD/CABG


Lipitor 20 mg PO QHS (as Crestor is not carried by our pharmacy)


Metoprolol Succinate 50 mg PO QHS


ASA 81 mg PO 1x/day


F/U 2D Echocardiogram





7). Hx HTN


Metoprolol Succinate 50 mg PO QHS





8). Hx HLD


Lipitor 20 mg PO QHS


F/U Lipid Panel





9). Hx CVA


Lipitor 20 mg PO QHS


ASA 81 mg PO 1x/day





10). Hx Anxiety


Xanax 1 mg PO 2x/day PRN Anxiety





11). Prophylactic Measures


Protonix 40 mg PO 1x/day


Lovenox 40 mg SC 1x/day


Florastor 250 mg PO 2x/day


Heart Healthy 2gm Na Diet





Past Patient History





- Infectious Disease


Hx of Infectious Diseases: None





- Past Medical History & Family History


Past Medical History?: Yes





- Past Social History


Smoking Status: Former Smoker





- CARDIAC


Hx Congestive Heart Failure: Yes


Hx Hypercholesterolemia: Yes


Hx Hypertension: Yes





- PULMONARY


Hx Asthma: Yes


Hx Chronic Obstructive Pulmonary Disease (COPD): Yes


Hx Pneumonia: Yes





- NEUROLOGICAL


Hx Neurological Disorder: Yes


HX Cerebrovascular Accident: Yes (x3)





- HEENT


Hx HEENT Problems: No





- RENAL


Hx Chronic Kidney Disease: No





- ENDOCRINE/METABOLIC


Hx Endocrine Disorders: No





- HEMATOLOGICAL/ONCOLOGICAL


Hx Blood Disorders: No





- INTEGUMENTARY


Hx Dermatological Problems: No





- MUSCULOSKELETAL/RHEUMATOLOGICAL


Hx Osteoporosis: Yes





- GASTROINTESTINAL


Hx Gastrointestinal Disorders: No





- GENITOURINARY/GYNECOLOGICAL


Hx Genitourinary Disorders: No





- PSYCHIATRIC


Hx Anxiety: Yes





- SURGICAL HISTORY


Hx Appendectomy: Yes


Hx Coronary Artery Bypass Graft: Yes (TRIPLE BYPASS. 2009)





- ANESTHESIA


Hx Anesthesia: Yes


Hx Anesthesia Reactions: No


Hx Malignant Hyperthermia: No





Meds


Allergies/Adverse Reactions: 


 Allergies











Allergy/AdvReac Type Severity Reaction Status Date / Time


 


Penicillins AdvReac  ANGIOEDEMA Verified 06/29/17 20:44














- Medications


Medications: 


 Current Medications





Albuterol/Ipratropium (Duoneb 3 Mg/0.5 Mg (3 Ml) Ud)  3 ml INH RQ4 PRN


   PRN Reason: Shortness of Breath


Alprazolam (Xanax)  1 mg PO BID PRN


   PRN Reason: Anxiety


Atorvastatin Calcium (Lipitor)  20 mg PO HS Replaced by [facility]


   Last Admin: 06/30/17 00:13 Dose:  20 mg


Azithromycin 500 mg/ Sodium (Chloride)  250 mls @ 250 mls/hr IVPB DAILY Replaced by [facility]


   Last Admin: 06/30/17 01:34 Dose:  250 mls/hr


Ceftriaxone Sodium 1 gm/ (Sodium Chloride)  100 mls @ 100 mls/hr IVPB DAILY Replaced by [facility]


   Last Admin: 06/30/17 01:35 Dose:  100 mls/hr


Methylprednisolone 60 mg/ (Sodium Chloride)  50.96 mls @ 101.92 mls/hr IVPB Q8H 

Replaced by [facility]


Metoprolol Succinate (Toprol Xl)  50 mg PO [facility]


   Last Admin: 06/30/17 00:13 Dose:  50 mg


Ondansetron HCl (Zofran Inj)  4 mg IVP Q6 PRN


   PRN Reason: Nausea/Vomiting


Pantoprazole Sodium (Protonix Inj)  40 mg IVP DAILY Replaced by [facility]


   Last Admin: 06/30/17 01:42 Dose:  40 mg


Saccharomyces Boulardii (Florastor)  250 mg PO BID Replaced by [facility]


   Last Admin: 06/30/17 01:46 Dose:  250 mg


Vitamin D (Vitamin D 400 Intl Units Tab)  400 intlu PO DAILY Replaced by [facility]











Results





- Vital Signs


Recent Vital Signs: 


 Last Vital Signs











Temp  98.8 F   06/30/17 00:22


 


Pulse  118 H  06/30/17 00:22


 


Resp  14   06/30/17 00:22


 


BP  94/57 L  06/30/17 00:22


 


Pulse Ox  96   06/30/17 00:22














- Labs


Result Diagrams: 


 06/29/17 21:15





 06/29/17 21:15


Labs: 


 Laboratory Results - last 24 hr











  06/30/17





  00:10


 


Urine Color  Yellow


 


Urine Clarity  Slighty-cloudy


 


Urine pH  6.0


 


Ur Specific Gravity  1.008


 


Urine Protein  Negative


 


Urine Glucose (UA)  Neg


 


Urine Ketones  Negative


 


Urine Blood  Large


 


Urine Nitrate  Positive H


 


Urine Bilirubin  Negative


 


Urine Urobilinogen  0.2-1.0


 


Ur Leukocyte Esterase  Small


 


Urine RBC (Auto)  152 H


 


Urine Microscopic WBC  9 H


 


Ur Squamous Epith Cells  2


 


Urine Bacteria  Mod H


 


Hyaline Casts  3-5 H

## 2017-06-30 NOTE — CT
EXAM:

  CT Abdomen and Pelvis Without Intravenous Contrast



CLINICAL HISTORY:

  71 years old, female; Pain; Abdominal pain; Epigastric; Prior surgery; 

Surgery date: 6+ months; Surgery type: Triple bypass coronary. Appendectomy; 

Patient HX: Fall please comment on bony pelvis/hips; Additional info: Ruq and 

epigastric adominal pain



TECHNIQUE:

  Axial computed tomography images of the abdomen and pelvis without 

intravenous contrast.  This CT exam was performed using one or more of the 

following dose reduction techniques:  automated exposure control, adjustment of 

the mA and/or kV according to patient size, and/or use of iterative 

reconstruction technique.

  Coronal and sagittal reformatted images were created and reviewed.



EXAM DATE/TIME:

  6/29/2017 11:49 PM



COMPARISON:

  No relevant prior studies available.



FINDINGS:

  Lower thorax:  The heart is at the upper limits of normal for size. There is 

mild diffuse increase in interstitial markings There is bilateral lower lobe 

airspace disease. There are small bilateral pleural effusions there is a small 

hiatal hernia. There is scarring at the lung bases.  There is a small hiatal 

hernia.



 ABDOMEN:

  Liver:  Very small low attenuation lesions in the liver too small to 

accurately characterize.

  Gallbladder and bile ducts:  Gallbladder is distended. Common bile duct is 

prominent.

  Pancreas:  Pancreas is atrophic with fatty replacement.

  Spleen:  unremarkable

  Adrenals:  unremarkable

  Kidneys and ureters:  There is right renal scarring. There is a right renal 

cyst. There are dystrophic right renal calcifications.Left kidney is 

unremarkable.There is no pelvocaliectasis or ureterectasis.

  Stomach and bowel:  Stomach is partially distended. Rotation is normal. There 

is no obstruction. Terminal ileum is unremarkable. Appendix is surgically 

absent.Colon is incompletely distended which limits evaluation.There is 

diverticulosis.

  Appendix:  See stomach and bowel



 PELVIS:

  Bladder:  unremarkable

  Reproductive:  Uterus is absent.  There are no adnexal masses.



 ABDOMEN and PELVIS:

  Intraperitoneal space:  There is no free air.There is no free fluid.

  Bones/joints:  Bony structures are osteopenic. There are degenerative changes 

in the spine. There is anterolisthesis L4 on L5. There is degenerative facet 

disease greatest at L4-5 and L5-S1. There is mild disc bulging L4/L5. There are 

no pelvic fractures. There is no sacral fracture. There are no hip fractures.

  Soft tissues:  unremarkable

  Vasculature:  There are vascular calcifications.

  Lymph nodes:  unremarkable



IMPRESSION:  Small bilateral pleural effusions with airspace disease at both 

lung bases; distended gallbladder with prominent common duct; no fracture seen

## 2017-07-01 VITALS
TEMPERATURE: 98.3 F | SYSTOLIC BLOOD PRESSURE: 142 MMHG | OXYGEN SATURATION: 92 % | HEART RATE: 86 BPM | DIASTOLIC BLOOD PRESSURE: 82 MMHG

## 2017-07-01 RX ADMIN — ENOXAPARIN SODIUM SCH MG: 40 INJECTION SUBCUTANEOUS at 08:24

## 2017-07-01 RX ADMIN — METHYLPREDNISOLONE SODIUM SUCCINATE SCH MLS/HR: 125 INJECTION, POWDER, FOR SOLUTION INTRAMUSCULAR; INTRAVENOUS at 05:40

## 2017-07-01 RX ADMIN — Medication SCH MG: at 08:24

## 2017-07-01 RX ADMIN — Medication SCH INTLU: at 08:25

## 2017-08-03 ENCOUNTER — HOSPITAL ENCOUNTER (EMERGENCY)
Dept: HOSPITAL 14 - H.ER | Age: 72
Discharge: HOME | End: 2017-08-03
Payer: MEDICARE

## 2017-08-03 VITALS
DIASTOLIC BLOOD PRESSURE: 75 MMHG | HEART RATE: 88 BPM | SYSTOLIC BLOOD PRESSURE: 132 MMHG | OXYGEN SATURATION: 96 % | TEMPERATURE: 98 F | RESPIRATION RATE: 16 BRPM

## 2017-08-03 VITALS — BODY MASS INDEX: 33.1 KG/M2

## 2017-08-03 DIAGNOSIS — Z79.82: ICD-10-CM

## 2017-08-03 DIAGNOSIS — Z95.1: ICD-10-CM

## 2017-08-03 DIAGNOSIS — M17.12: Primary | ICD-10-CM

## 2017-08-03 DIAGNOSIS — Z86.73: ICD-10-CM

## 2017-08-03 DIAGNOSIS — Z88.0: ICD-10-CM

## 2017-08-03 NOTE — ED PDOC
Lower Extremity Pain/Injury


Time Seen by Provider: 08/03/17 15:04


Chief Complaint (Nursing): Lower Extremity Problem/Injury


Chief Complaint (Provider): Left knee pain 


History Per: Patient


History/Exam Limitations: no limitations


Onset/Duration Of Symptoms: Days (7), Worse Since


Current Symptoms Are (Timing): Still Present


Severity: Moderate


Additional Complaint(s): 


Ange Roe is a 72 y/o female, with a pertinent past medical history 

of Rheumatoid Arthritis and Osteoporosis, presenting to the ER on 8/3/2017 with 

complaints of left knee pain for the past seven days. Patient reports pain 

worsens with ambulation due to the fact she is always moving around because she 

has to take care of her son in her house. Patient states she saw her PMD, Dr. Burnette, today who gave her a prescription for an X-Ray. However, she wanted more 

medical attention which prompted the patient to ambulate to the ED today. She 

further notes she had a prescription of Percocet filled recently, but she 

discontinued her course after feeling a "loopy" sensation when she took it. 

Patient denies any fever or known injuries to the area. She reports history of 

similar pain in the past in her right knee.








Past Medical History


Reviewed: Historical Data, Nursing Documentation, Vital Signs


Vital Signs: 





 Last Vital Signs











Temp  98 F   08/03/17 14:24


 


Pulse  88   08/03/17 14:24


 


Resp  16   08/03/17 14:24


 


BP  132/75   08/03/17 14:24


 


Pulse Ox  96   08/03/17 14:24














- Medical History


PMH: Anxiety, Arthritis, Asthma, CAD, CHF, COPD, CVA (x3), Hiatal Hernia, HTN, 

Hypercholesterolemia, Osteoporosis, Pneumonia


   Denies: Chronic Kidney Disease





- Surgical History


Surgical History: Appendectomy, CABG (TRIPLE BYPASS. 2009)





- Family History


Family History: States: Unknown Family Hx





- Social History


Current smoker - smoking cessation education provided: No


Alcohol: None


Drugs: Denies





- Immunization History


Hx Tetanus Toxoid Vaccination: No


Hx Influenza Vaccination: No


Hx Pneumococcal Vaccination: No





- Home Medications


Home Medications: 


 Ambulatory Orders











 Medication  Instructions  Recorded


 


Metoprolol Succinate [Toprol XL] 50 mg PO HS 11/12/14


 


ALPRAZolam [Xanax] 1 mg PO BID PRN 06/30/16


 


Aspirin [Ecotrin] 81 mg PO HS 06/30/16


 


Cholecalciferol 400 Intl Units 400 unit PO DAILY 06/30/16





[Vitamin D 400 Intl Units Tab]  


 


Potassium Chloride [K-Dur 20 mEq 20 meq PO DAILY 06/30/16





ER Tab]  


 


Rosuvastatin Calcium [Crestor] 10 mg PO HS 06/30/16














- Allergies


Allergies/Adverse Reactions: 


 Allergies











Allergy/AdvReac Type Severity Reaction Status Date / Time


 


Penicillins AdvReac  ANGIOEDEMA Verified 06/29/17 20:44














Review of Systems


ROS Statement: Except As Marked, All Systems Reviewed And Found Negative


Constitutional: Negative for: Fever


Musculoskeletal: Positive for: Leg Pain ((+) knee pain )


Neurological: Negative for: Weakness, Numbness





Physical Exam





- Reviewed


Nursing Documentation Reviewed: Yes


Vital Signs Reviewed: Yes





- Physical Exam


Appears: Positive for: Non-toxic


Head Exam: Positive for: ATRAUMATIC, NORMOCEPHALIC


Skin: Positive for: Normal Color.  Negative for: Rash


Eye Exam: Positive for: Normal appearance


Neck: Positive for: Normal


Extremity: Positive for: Normal ROM (full flexion and extension with crepitus 

on right knee, left knee- able to flex to 100 degrees and can extend 180 with 

crepitus), Tenderness ((+) ttp to left knee), Other ((+) bilat bony enlargement 

to knees, worse on left than right; no overlying warmth or erythema; no open 

wounds )


Neurologic/Psych: Positive for: Alert, Oriented, Gait (steady)





- ECG


O2 Sat by Pulse Oximetry: 96


Pulse Ox Interpretation: Normal





- Other Rad


  ** X-ray Bilateral Knees


X-Ray: Interpreted by Me


X-Ray Interpretation: (+)DJD bilaterally, no fracture noted





Medical Decision Making


Medical Decision Making: 


15:04





Initial Impression- 72 y/o female with left knee pain





Initial Plan-


* XR Left Knee





Patient requesting compression for her left knee.  ACE wrap applied by ED RN, 

with improvement of symptoms on reevaluation. 





Patient advised to follow up with her PMD, or Dr. Root, for further 

evaluation.  Patient advised of radiology findings.  She was advised to return 

to the ED for any worsening or change in symptoms. 





Documented by Ty Jansen, acting as a scribe for Sarina Jon PA-C





All medical record entries made by the Scribe were at my direction and 

personally dictated by me. I


have reviewed the chart and agree that the record accurately reflects my 

personal performance of the


history, physical exam, medical decision making, and the department course for 

this patient. I have


also personally directed, reviewed, and agree with the discharge instructions 

and disposition.





Disposition





- Clinical Impression


Clinical Impression: 


 Knee pain, bilateral, Osteoarthritis








- Patient ED Disposition


Is Patient to be Admitted: No


Counseled Patient/Family Regarding: Studies Performed, Diagnosis, Need For 

Followup





- Disposition


Referrals: 


Glenn Nichols III, MD [Staff Provider] - 


Disposition: Routine/Home


Disposition Time: 16:30


Condition: STABLE


Additional Instructions: 


Avoid strenuous activity, compression to area, f/u with PMD or Dr. Nichols-

leesa.


Instructions:  Osteoarthritis (ED), Knee Pain (ED)


Forms:  [facility] (English)

## 2017-08-03 NOTE — RAD
PROCEDURE:  Bilateral Knee Radiographs.



HISTORY:

pain



COMPARISON:

No prior comparison right knee. Left knee radiographs dated 

11/12/2014 are used for comparison of the left knee.



FINDINGS:



BONES:

Right Knee:  Normal. No fracture or dislocation. 



Left Knee:  Normal. No fracture or dislocation. 



JOINTS:

Marked medial compartment joint space narrowing is appreciated as 

well as at the medial greater than lateral patella patellofemoral 

joints.  Prominent osteophyte development is seen in the same 

distributions and there is moderate the lateral femorotibial 

compartment.  Prominent cortical sclerosis appreciate the medial 

femoral compartments. 



SOFT TISSUES:

Right Knee: Normal.



Left Knee: Surgical clips in the medial distal thigh/ left knee soft 

tissues



JOINT EFFUSION:

Right Knee: None. 



Left Knee: None.



OTHER FINDINGS:

None.



IMPRESSION:

Advanced osteoarthritis appreciate bilaterally, tricompartmental.  

Left knee appears slightly worse compared to prior left knee 

radiograph 11/12/2014. No prior right comparison.

## 2017-08-06 ENCOUNTER — HOSPITAL ENCOUNTER (EMERGENCY)
Dept: HOSPITAL 14 - H.ER | Age: 72
Discharge: HOME | End: 2017-08-06
Payer: MEDICARE

## 2017-08-06 VITALS — BODY MASS INDEX: 29.9 KG/M2

## 2017-08-06 VITALS
DIASTOLIC BLOOD PRESSURE: 78 MMHG | RESPIRATION RATE: 16 BRPM | TEMPERATURE: 99 F | SYSTOLIC BLOOD PRESSURE: 148 MMHG | OXYGEN SATURATION: 100 % | HEART RATE: 84 BPM

## 2017-08-06 DIAGNOSIS — M25.562: Primary | ICD-10-CM

## 2017-08-06 NOTE — ED PDOC
Lower Extremity Pain/Injury


Time Seen by Provider: 08/06/17 12:23


Chief Complaint (Nursing): Lower Extremity Problem/Injury


Chief Complaint (Provider): left knee pain


History Per: Patient


Additional Complaint(s): 


Patient was seen in ED 2 days ago for bilateral knee pain.  She had x-rays 

completed and an ace wrap was applied to her left knee but patient states the 

ace wrap has not helped the pain.  Patient came back to ED today requesting 

knee immobilizer for left leg.  Patient was prescribed percocet last week by 

PMD but she has not been taking this med or anything else for pain.  Patient 

denies any fall since last ED visit 2 days ago.  





Past Medical History


Reviewed: Historical Data, Nursing Documentation, Vital Signs


Vital Signs: 





 Last Vital Signs











Temp  99.0 F   08/06/17 12:19


 


Pulse  84   08/06/17 12:19


 


Resp  16   08/06/17 12:19


 


BP  148/78   08/06/17 12:19


 


Pulse Ox  100   08/06/17 12:19














- Medical History


PMH: Anxiety, Arthritis, Asthma, CAD, CHF, COPD, CVA (x3), Hiatal Hernia, HTN, 

Hypercholesterolemia, Osteoporosis, Pneumonia





- Surgical History


Surgical History: Appendectomy, CABG (TRIPLE BYPASS. 2009)





- Family History


Family History: States: No Known Family Hx





- Living Arrangements


Living Arrangements: With Family





- Social History


Current smoker - smoking cessation education provided: No


Alcohol: None


Drugs: Denies





- Home Medications


Home Medications: 


 Ambulatory Orders











 Medication  Instructions  Recorded


 


Metoprolol Succinate [Toprol XL] 50 mg PO HS 11/12/14


 


ALPRAZolam [Xanax] 1 mg PO BID PRN 06/30/16


 


Aspirin [Ecotrin] 81 mg PO HS 06/30/16


 


Cholecalciferol 400 Intl Units 400 unit PO DAILY 06/30/16





[Vitamin D 400 Intl Units Tab]  


 


Potassium Chloride [K-Dur 20 mEq 20 meq PO DAILY 06/30/16





ER Tab]  


 


Rosuvastatin Calcium [Crestor] 10 mg PO HS 06/30/16














- Allergies


Allergies/Adverse Reactions: 


 Allergies











Allergy/AdvReac Type Severity Reaction Status Date / Time


 


Penicillins AdvReac  ANGIOEDEMA Verified 08/06/17 12:18














Wells Criteria for PE





- Wells Criteria for Pulmonary Embolism


Clinical Signs and Symptoms of DVT: No


P.E is #1 Diagnosis, or Equally Likely: No


Heart Rate >100: No


Immobilization at least 3 days;Surgery previous 4 weeks: No


Previous, objectively diagnosed PE or DVT: No


Hemoptysis: No


Malignancy w/treatment within 6 months, or palliative: No


Total Score: 0





Review of Systems


ROS Statement: Except As Marked, All Systems Reviewed And Found Negative


Constitutional: Negative for: Fever


Musculoskeletal: Positive for: Other (left knee pain)





Physical Exam





- Reviewed


Nursing Documentation Reviewed: Yes


Vital Signs Reviewed: Yes





- Physical Exam


Appears: Positive for: Well, Non-toxic, No Acute Distress


Skin: Negative for: Rash


Eye Exam: Positive for: Normal appearance


Extremity: Positive for: Other (full ROM left knee with pain, mild swelling 

left knee with no warmth or erythema, no calf swelling or tenderness, normal 

distal sensation)


Neurologic/Psych: Positive for: Alert, Oriented, Gait (steady)





- ECG


O2 Sat by Pulse Oximetry: 100


Pulse Ox Interpretation: Normal





Medical Decision Making


Medical Decision Making: 


Impression: Knee pain





Previous records reviewed, x-ray of both knees obtained 2 days ago in ED showed 

no acute fx or dis and degenerative changes





Plan:


PO tylenol


Knee immobilizer left leg





Patient has ortho follow up this week.  





Procedures





- Splinting


Location: left knee


Pre-Made Type: knee immobilizer


Pre-Proc Neuro Vasc Exam: normal


Post-Proc Neuro Vasc Exam: normal





Disposition





- Clinical Impression


Clinical Impression: 


 Knee pain








- Patient ED Disposition


Is Patient to be Admitted: No


Counseled Patient/Family Regarding: Diagnosis, Need For Followup





- Disposition


Referrals: 


Glenn Nichols III, MD [Staff Provider] - 


Disposition: Routine/Home


Disposition Time: 12:35


Condition: STABLE


Additional Instructions: 


Ice, rest and elevate affected area.  Tylenol for mild pain, percocet for more 

severe pain.  Follow up ASAP with orthopedist. 


Instructions:  Knee Pain (ED), Arthralgia (ED), Knee Immobilizer (ED)


Forms:  [facility] (English)

## 2018-02-06 ENCOUNTER — HOSPITAL ENCOUNTER (EMERGENCY)
Dept: HOSPITAL 14 - H.ER | Age: 73
LOS: 1 days | Discharge: HOME | End: 2018-02-07
Payer: MEDICARE

## 2018-02-06 VITALS — BODY MASS INDEX: 29.9 KG/M2

## 2018-02-06 DIAGNOSIS — I10: ICD-10-CM

## 2018-02-06 DIAGNOSIS — I50.9: ICD-10-CM

## 2018-02-06 DIAGNOSIS — I25.10: ICD-10-CM

## 2018-02-06 DIAGNOSIS — M81.0: ICD-10-CM

## 2018-02-06 DIAGNOSIS — Z86.73: ICD-10-CM

## 2018-02-06 DIAGNOSIS — E78.00: ICD-10-CM

## 2018-02-06 DIAGNOSIS — N39.0: Primary | ICD-10-CM

## 2018-02-06 DIAGNOSIS — R60.0: ICD-10-CM

## 2018-02-06 DIAGNOSIS — J44.9: ICD-10-CM

## 2018-02-06 LAB
ALBUMIN SERPL-MCNC: 4.3 G/DL (ref 3.5–5)
ALBUMIN/GLOB SERPL: 1.6 {RATIO} (ref 1–2.1)
ALT SERPL-CCNC: 34 U/L (ref 9–52)
AST SERPL-CCNC: 25 U/L (ref 14–36)
BASOPHILS # BLD AUTO: 0.1 K/UL (ref 0–0.2)
BASOPHILS NFR BLD: 1.4 % (ref 0–2)
BNP SERPL-MCNC: 189 PG/ML (ref 0–900)
BUN SERPL-MCNC: 19 MG/DL (ref 7–17)
CALCIUM SERPL-MCNC: 9.8 MG/DL (ref 8.4–10.2)
EOSINOPHIL # BLD AUTO: 0.1 K/UL (ref 0–0.7)
EOSINOPHIL NFR BLD: 1.6 % (ref 0–4)
ERYTHROCYTE [DISTWIDTH] IN BLOOD BY AUTOMATED COUNT: 13 % (ref 11.5–14.5)
GFR NON-AFRICAN AMERICAN: > 60
HGB BLD-MCNC: 13 G/DL (ref 12–16)
LYMPHOCYTES # BLD AUTO: 1.3 K/UL (ref 1–4.3)
LYMPHOCYTES NFR BLD AUTO: 27.1 % (ref 20–40)
MCH RBC QN AUTO: 27.4 PG (ref 27–31)
MCHC RBC AUTO-ENTMCNC: 32.2 G/DL (ref 33–37)
MCV RBC AUTO: 85 FL (ref 81–99)
MONOCYTES # BLD: 0.5 K/UL (ref 0–0.8)
MONOCYTES NFR BLD: 11.2 % (ref 0–10)
NEUTROPHILS # BLD: 2.9 K/UL (ref 1.8–7)
NEUTROPHILS NFR BLD AUTO: 58.7 % (ref 50–75)
NRBC BLD AUTO-RTO: 0.1 % (ref 0–0)
PLATELET # BLD: 227 K/UL (ref 130–400)
PMV BLD AUTO: 8.1 FL (ref 7.2–11.7)
RBC # BLD AUTO: 4.75 MIL/UL (ref 3.8–5.2)
WBC # BLD AUTO: 4.9 K/UL (ref 4.8–10.8)

## 2018-02-06 NOTE — US
EXAM:

  US Duplex Bilateral Lower Extremity Veins



EXAM DATE/TIME:

  2/6/2018 9:10 PM



CLINICAL HISTORY:

  72 years old, female; Pain; Leg, lower; Bilateral; Additional info: SOB



TECHNIQUE:

  Real-time ultrasound scan of the veins of the bilateral lower extremities 

with color Doppler flow, spectral waveform analysis and compression.



COMPARISON:

  There are no prior studies for comparison.



FINDINGS:

  Right deep veins:  Common femoral, superficial femoral, popliteal and 

posterior tibial veins were evaluated.



All veins examined are compressible. There are no intraluminal filling defects. 

There is expected blood flow on Doppler imaging. There is change in waveform 

with augmentation.



  Left deep veins: Common femoral, superficial femoral, popliteal and posterior 

tibial veins were evaluated.

  

All veins examined are compressible. There are no intraluminal filling defects. 

There is expected blood flow on Doppler imaging. There is change in waveform 

with augmentation.



Impression:  No deep venous thrombosis in the visualized vascular segments of 

the lower extremities

## 2018-02-06 NOTE — ED PDOC
HPI: SOB/CHF/COPD


Time Seen by Provider: 02/06/18 21:03


Chief Complaint (Nursing): Shortness Of Breath


Chief Complaint (Provider): Bilateral lower extremity swelling


History Per: Patient


History/Exam Limitations: no limitations


Onset/Duration Of Symptoms: Days (x 2 weeks)


Current Symptoms Are (Timing): Still Present


Additional Complaint(s): 





71 y/o female presents complaining of bilateral lower extremity swelling for 

the past 2 weeks. Associated with bilateral posterior knee pain. Patient also 

complaining of mild shortness of breath. Denies chest pain, cough, or fever. 





PMD: Dr. Severo Burnette





Past Medical History


Reviewed: Historical Data, Nursing Documentation, Vital Signs


Vital Signs: 


 Last Vital Signs











Temp  98 F   02/07/18 02:19


 


Pulse  71   02/07/18 02:19


 


Resp  18   02/07/18 02:19


 


BP  134/80   02/07/18 03:04


 


Pulse Ox  97   02/07/18 03:43














- Medical History


PMH: Anxiety, Arthritis (to knees), Asthma, CAD, CHF, COPD, CVA (x3), Hiatal 

Hernia, HTN, Hypercholesterolemia, Osteoporosis, Pneumonia


   Denies: Chronic Kidney Disease





- Surgical History


Surgical History: Appendectomy, CABG (TRIPLE BYPASS. 2009)





- Family History


Family History: States: Unknown Family Hx





- Immunization History


Hx Tetanus Toxoid Vaccination: No


Hx Influenza Vaccination: No


Hx Pneumococcal Vaccination: No





- Home Medications


Home Medications: 


 Ambulatory Orders











 Medication  Instructions  Recorded


 


Metoprolol Succinate [Toprol XL] 50 mg PO HS 11/12/14


 


ALPRAZolam [Xanax] 1 mg PO BID PRN 06/30/16


 


Aspirin [Ecotrin] 81 mg PO HS 06/30/16


 


Cholecalciferol 400 Intl Units 400 unit PO DAILY 06/30/16





[Vitamin D 400 Intl Units Tab]  


 


Rosuvastatin Calcium [Crestor] 10 mg PO HS 06/30/16


 


Nitrofurantoin Macrocrystals 100 mg PO BID #14 cap 02/07/18





[Macrobid]  














- Allergies


Allergies/Adverse Reactions: 


 Allergies











Allergy/AdvReac Type Severity Reaction Status Date / Time


 


Penicillins AdvReac  ANGIOEDEMA Verified 02/06/18 20:47














Review of Systems


ROS Statement: Except As Marked, All Systems Reviewed And Found Negative


Constitutional: Negative for: Fever


Cardiovascular: Negative for: Chest Pain


Respiratory: Positive for: Shortness of Breath.  Negative for: Cough


Musculoskeletal: Positive for: Leg Pain (bilateral knee pain), Other (swelling 

to bilateral LE)





Physical Exam





- Reviewed


Nursing Documentation Reviewed: Yes


Vital Signs Reviewed: Yes





- Physical Exam


Appears: Positive for: Non-toxic, No Acute Distress


Head Exam: Positive for: ATRAUMATIC, NORMOCEPHALIC


Skin: Positive for: Normal Color


Eye Exam: Positive for: Normal appearance


Cardiovascular/Chest: Positive for: Regular Rate, Rhythm


Respiratory: Positive for: Rhonchi (scattered on the right).  Negative for: 

Wheezing, Respiratory Distress


Gastrointestinal/Abdominal: Positive for: Normal Exam, Soft.  Negative for: 

Tenderness


Extremity: Positive for: Tenderness (to the right popliteal fossa), Swelling (2

+ edema to lower extremities bilaterally).  Negative for: Calf Tenderness (or 

calf swelling, bilaterally)


Neurologic/Psych: Positive for: Alert, Oriented





- Laboratory Results


Result Diagrams: 


 02/06/18 21:27





 02/06/18 21:27





- ECG


O2 Sat by Pulse Oximetry: 97 (RA)


Pulse Ox Interpretation: Normal





Medical Decision Making


Medical Decision Making: 





Time: 21:10


Initial Plan: 


* CMP


* Troponin I


* pro BNP


* CBC


* EKG


* Chest X-Ray


* Urine dip


* Pending US Duplex of bilateral LE vein





--------------------------------------------------------------------------------

-----------------


Scribe Attestation:


Documented by Corrie Lau, acting as a scribe for Avery Campos MD





Provider Scribe Attestation:


All medical record entries made by the Scribe were at my direction and 

personally dictated by me. I have reviewed the chart and agree that the record 

accurately reflects my personal performance of the history, physical exam, 

medical decision making, and the department course for this patient. I have 

also personally directed, reviewed, and agree with the discharge instructions 

and disposition.





Disposition





- Clinical Impression


Clinical Impression: 


 Lower extremity edema, UTI (urinary tract infection)








- Patient ED Disposition


Is Patient to be Admitted: Transfer of Care





- Disposition


Disposition: Transfer of Care


Disposition Time: 22:00


Condition: GOOD


Additional Instructions: 


Follow-up with PMD within 2 days.  Return to ED if condition worsens.  Take 

full course of antibiotics.  Speak to your PMD about increasing lower extremity 

edema.  


Prescriptions: 


Nitrofurantoin Macrocrystals [Macrobid] 100 mg PO BID #14 cap


Instructions:  Urinary Tract Infection in Women (ED), Leg Edema (ED)


Forms:  [facility] (English)


Patient Signed Over To: Noemy Fam

## 2018-02-07 VITALS — RESPIRATION RATE: 18 BRPM

## 2018-02-07 VITALS — HEART RATE: 71 BPM | TEMPERATURE: 98 F

## 2018-02-07 VITALS — SYSTOLIC BLOOD PRESSURE: 134 MMHG | DIASTOLIC BLOOD PRESSURE: 80 MMHG

## 2018-02-07 VITALS — OXYGEN SATURATION: 97 %

## 2018-02-07 LAB
BILIRUB UR-MCNC: NEGATIVE MG/DL
COLOR UR: YELLOW
GLUCOSE UR STRIP-MCNC: (no result) MG/DL
LEUKOCYTE ESTERASE UR-ACNC: (no result) LEU/UL
PH UR STRIP: 6 [PH] (ref 5–8)
PROT UR STRIP-MCNC: 30 MG/DL
RBC # UR STRIP: NEGATIVE /UL
SP GR UR STRIP: 1.02 (ref 1–1.03)
SQUAMOUS EPITHIAL: 2 /HPF (ref 0–5)
URINE CLARITY: (no result)
URINE NITRATE: NEGATIVE
UROBILINOGEN UR-MCNC: (no result) MG/DL (ref 0.2–1)

## 2018-02-07 NOTE — CARD
--------------- APPROVED REPORT --------------





EKG Measurement

Heart [id number]

TN [id number]

[id number]

[id number]

[id number]



<Conclusion>

Normal sinus rhythm

Right bundle branch block

Left anterior fascicular block

*** Bifascicular block ***

Cannot rule out Inferior infarct (masked by fascicular block?), age 

undetermined

Abnormal ECG

## 2018-02-07 NOTE — ED PDOC
- Laboratory Results


Result Diagrams: 


 18 21:27





 18 21:27





- ECG


O2 Sat by Pulse Oximetry: 97 (RA)





Medical Decision Making


Medical Decision Makin:00


Patient endorsed to me by Dr. Campos pending US for lower extremity swelling. 

Cxray negative.  BNP and trop WNL.  Denies chest pain or shortness of breath 

and ambulating around ED without issue.





US Duplex Bilateral Lower Extremity Veins


FINDINGS:


Right deep veins: Common femoral, superficial femoral, popliteal and posterior 

tibial veins were


evaluated.


All veins examined are compressible. There are no intraluminal filling defects. 

There is expected


blood flow on Doppler imaging. There is change in waveform with augmentation.


Left deep veins: Common femoral, superficial femoral, popliteal and posterior 

tibial veins were


evaluated.


All veins examined are compressible. There are no intraluminal filling defects. 

There is expected


blood flow on Doppler imaging. There is change in waveform with augmentation.


Impression: No deep venous thrombosis in the visualized vascular segments of 

the lower


extremities








2:49AM


Patient urinated without cather.  UA positive.  Will dc with antibiotics.  

Given 1 dose of lasix and will follow-up with PMD within 2 days





--------------------------------------------------------------------------------

----------------- 


Scribe Attestation:


Documented by Rola Martinez, acting as a scribe for Noemy Fam MD.





Provider Scribe Attestation:


All medical record entries made by the Scribe were at my direction and 

personally dictated by me. I have reviewed the chart and agree that the record 

accurately reflects my personal performance of the history, physical exam, 

medical decision making, and the department course for this patient. I have 

also personally directed, reviewed, and agree with the discharge instructions 

and disposition.








Disposition





- Clinical Impression


Clinical Impression: 


 Lower extremity edema, UTI (urinary tract infection)








- POA


Present On Arrival: None





- Disposition


Disposition: Routine/Home


Disposition Time: 02:50


Condition: GOOD


Additional Instructions: 


Follow-up with PMD within 2 days.  Return to ED if condition worsens.  Take 

full course of antibiotics.  Speak to your PMD about increasing lower extremity 

edema.  


Prescriptions: 


Nitrofurantoin Macrocrystals [Macrobid] 100 mg PO BID #14 cap


Instructions:  Urinary Tract Infection in Women (ED), Leg Edema (ED)


Forms:  [facility] (English)

## 2018-02-07 NOTE — RAD
HISTORY:

sob  



COMPARISON:

6/29/2017



TECHNIQUE:

Chest PA and lateral



FINDINGS:



LUNGS:

No interval consolidation the prior interstitial pulmonary edema 

appears less now than before. The previously referenced central 

pulmonary venous congestion is also slightly less- this may be a 

chronic status for this patient. No active pulmonary disease.



PLEURA:

Small left pleural effusion probable -similar-appearing. No 

pneumothorax apparent.



CARDIOVASCULAR:

Cardiomegaly -similar.  Central pulmonary venous congestion - less 

pronounced now



OSSEOUS STRUCTURES:

No significant abnormalities.



VISUALIZED UPPER ABDOMEN:

Normal.



OTHER FINDINGS:

Midline sternotomy and coronary artery bypass clips present -similar



IMPRESSION:

Interval decreased pulmonary interstitial edema /interval decreased 

pulmonary venous congestion.



Small left pleural effusion -similar-appearing.  Chronic left 

inferolateral pleural thickening-differential consideration.



Cardiomegaly -postop changes-stable

## 2018-02-12 ENCOUNTER — HOSPITAL ENCOUNTER (INPATIENT)
Dept: HOSPITAL 14 - H.ER | Age: 73
LOS: 6 days | Discharge: HOME | DRG: 191 | End: 2018-02-18
Attending: INTERNAL MEDICINE | Admitting: INTERNAL MEDICINE
Payer: MEDICARE

## 2018-02-12 VITALS — BODY MASS INDEX: 29.9 KG/M2

## 2018-02-12 DIAGNOSIS — J11.1: ICD-10-CM

## 2018-02-12 DIAGNOSIS — J45.901: ICD-10-CM

## 2018-02-12 DIAGNOSIS — Z87.440: ICD-10-CM

## 2018-02-12 DIAGNOSIS — S80.01XA: ICD-10-CM
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
## 2018-02-12 DIAGNOSIS — J20.9: ICD-10-CM
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
## 2018-02-12 DIAGNOSIS — Z88.0: ICD-10-CM

## 2018-02-12 DIAGNOSIS — Z86.73: ICD-10-CM

## 2018-02-12 DIAGNOSIS — J44.1: ICD-10-CM

## 2018-02-12 DIAGNOSIS — I50.9: ICD-10-CM

## 2018-02-12 DIAGNOSIS — J44.0: Primary | ICD-10-CM

## 2018-02-12 DIAGNOSIS — E78.5: ICD-10-CM

## 2018-02-12 DIAGNOSIS — Z95.1: ICD-10-CM

## 2018-02-12 DIAGNOSIS — Z87.01: ICD-10-CM

## 2018-02-12 DIAGNOSIS — Z87.891: ICD-10-CM

## 2018-02-12 DIAGNOSIS — I25.810: ICD-10-CM

## 2018-02-12 DIAGNOSIS — E78.00: ICD-10-CM

## 2018-02-12 DIAGNOSIS — I11.0: ICD-10-CM

## 2018-02-12 DIAGNOSIS — S20.219A: ICD-10-CM

## 2018-02-12 LAB
ALBUMIN SERPL-MCNC: 4.4 G/DL (ref 3.5–5)
ALBUMIN/GLOB SERPL: 1.4 {RATIO} (ref 1–2.1)
ALT SERPL-CCNC: 31 U/L (ref 9–52)
APTT BLD: 11.5 SECONDS (ref 25.6–37.1)
AST SERPL-CCNC: 26 U/L (ref 14–36)
BASOPHILS # BLD AUTO: 0 K/UL (ref 0–0.2)
BASOPHILS NFR BLD: 0.6 % (ref 0–2)
BUN SERPL-MCNC: 16 MG/DL (ref 7–17)
CALCIUM SERPL-MCNC: 9.5 MG/DL (ref 8.4–10.2)
EOSINOPHIL # BLD AUTO: 0 K/UL (ref 0–0.7)
EOSINOPHIL NFR BLD: 0.6 % (ref 0–4)
ERYTHROCYTE [DISTWIDTH] IN BLOOD BY AUTOMATED COUNT: 12.6 % (ref 11.5–14.5)
GFR NON-AFRICAN AMERICAN: > 60
HGB BLD-MCNC: 14.1 G/DL (ref 12–16)
INR PPP: 1 (ref 0.9–1.2)
LIPASE SERPL-CCNC: 109 U/L (ref 23–300)
LYMPHOCYTE: 3 % (ref 20–50)
LYMPHOCYTES # BLD AUTO: 0.3 K/UL (ref 1–4.3)
LYMPHOCYTES NFR BLD AUTO: 4.1 % (ref 20–40)
MCH RBC QN AUTO: 27.7 PG (ref 27–31)
MCHC RBC AUTO-ENTMCNC: 33 G/DL (ref 33–37)
MCV RBC AUTO: 84 FL (ref 81–99)
MONOCYTE: 5 % (ref 0–10)
MONOCYTES # BLD: 0.6 K/UL (ref 0–0.8)
MONOCYTES NFR BLD: 7.9 % (ref 0–10)
NEUTROPHILS # BLD: 6.4 K/UL (ref 1.8–7)
NEUTROPHILS NFR BLD AUTO: 86.8 % (ref 50–75)
NEUTROPHILS NFR BLD AUTO: 91 % (ref 42–75)
NRBC BLD AUTO-RTO: 0 % (ref 0–0)
PLATELET # BLD EST: NORMAL 10*3/UL
PLATELET # BLD: 197 K/UL (ref 130–400)
PMV BLD AUTO: 8.5 FL (ref 7.2–11.7)
PROTHROMBIN TIME: 27.1 SECONDS (ref 9.8–13.1)
RBC # BLD AUTO: 5.08 MIL/UL (ref 3.8–5.2)
RBC MORPH BLD: NORMAL
TOTAL CELLS COUNTED BLD: 100
VARIANT LYMPHS NFR BLD MANUAL: 1 % (ref 0–0)
WBC # BLD AUTO: 7.4 K/UL (ref 4.8–10.8)

## 2018-02-12 NOTE — ED PDOC
HPI: Abdomen


Time Seen by Provider: 18 13:51


Chief Complaint (Nursing): Abdominal Pain


Chief Complaint (Provider): Cough, abdominal pain, v/d


History Per: Patient


History/Exam Limitations: no limitations


Additional Complaint(s): 





Pt presents with c/o nausea, vomiting, diarrhea, cough productive of yellow 

sputum, burning in throat and chest pain.  Was evaluated on 18 and dx with 

UTI, states Macrobid is burning her throat.  Was evaluated by Cardiologist on 18 and given Lasix 40 mg PO bid for leg swelling, now feels dehydrated.  





Past Medical History


Reviewed: Nursing Documentation, Vital Signs


Vital Signs: 


 Last Vital Signs











Temp  98.4 F   18 13:28


 


Pulse  112 H  18 13:28


 


Resp  21   18 13:28


 


BP  131/68   18 13:28


 


Pulse Ox  93 L  18 17:20














- Medical History


PMH: Anxiety, Arthritis (to knees), Asthma, CAD, CHF, COPD, CVA (x3), Hiatal 

Hernia, HTN, Hypercholesterolemia, Osteoporosis, Pneumonia


   Denies: Chronic Kidney Disease





- Surgical History


Surgical History: Appendectomy, CABG (TRIPLE BYPASS. )





- Family History


Family History: States: Unknown Family Hx





- Immunization History


Hx Tetanus Toxoid Vaccination: No


Hx Influenza Vaccination: No


Hx Pneumococcal Vaccination: No





- Home Medications


Home Medications: 


 Ambulatory Orders











 Medication  Instructions  Recorded


 


Metoprolol Succinate [Toprol XL] 50 mg PO HS 14


 


ALPRAZolam [Xanax] 1 mg PO BID PRN 16


 


Aspirin [Ecotrin] 81 mg PO HS 16


 


Cholecalciferol 400 Intl Units 400 unit PO DAILY 16





[Vitamin D 400 Intl Units Tab]  


 


Rosuvastatin Calcium [Crestor] 10 mg PO HS 16


 


Nitrofurantoin Macrocrystals 100 mg PO BID #14 cap 18





[Macrobid]  














- Allergies


Allergies/Adverse Reactions: 


 Allergies











Allergy/AdvReac Type Severity Reaction Status Date / Time


 


Penicillins AdvReac  ANGIOEDEMA Verified 18 20:47














Review of Systems


Constitutional: Negative for: Fever, Chills


ENT: Positive for: Throat Pain.  Negative for: Throat Swelling


Cardiovascular: Positive for: Chest Pain (Burning).  Negative for: Palpitations


Respiratory: Positive for: Cough, Sputum.  Negative for: Shortness of Breath


Gastrointestinal: Positive for: Nausea, Vomiting, Abdominal Pain, Diarrhea, 

Other (Decreased appetite)


Genitourinary Female: Negative for: Dysuria, Hematuria


Musculoskeletal: Negative for: Neck Pain, Back Pain


Skin: Negative for: Rash, Lesions


Neurological: Negative for: Headache, Dizziness





Physical Exam





- Reviewed


Nursing Documentation Reviewed: Yes


Vital Signs Reviewed: Yes





- Physical Exam


Appears: Positive for: Well, No Acute Distress


Head Exam: Positive for: ATRAUMATIC, NORMAL INSPECTION


Skin: Positive for: Normal Color, Warm, Dry


Eye Exam: Positive for: Normal appearance, EOMI, PERRL


ENT: Positive for: Pharynx Is (Clear)


Neck: Positive for: Normal, Painless ROM, Supple


Cardiovascular/Chest: Positive for: Regular Rate, Rhythm


Respiratory: Positive for: Normal Breath Sounds.  Negative for: Rales, Rhonchi, 

Wheezing


Gastrointestinal/Abdominal: Positive for: Bowel Sounds, Soft, Tenderness (

Epigastric), Distended.  Negative for: Guarding, Rebound


Back: Positive for: Normal Inspection


Extremity: Positive for: Normal ROM.  Negative for: Swelling


Neurologic/Psych: Positive for: Alert, CNs II-XII, Oriented.  Negative for: 

Motor/Sensory Deficits





- Laboratory Results


Result Diagrams: 


 18 14:34





 18 14:34





- ECG


Interpretation Of ECG: 





NSR @ 97, LAD, RBBB.


O2 Sat by Pulse Oximetry: 93


Pulse Ox Interpretation: Abnormal





Medical Decision Making


Medical Decision Makin yo female with throat burning, chest burning, cough, vomiting, abdominal 

pain and diarrhea.


- labs


- EKG


- CXR


- CT abd/pelvis


- Viscous Lidocaine


- Maalox





Accession No. : [id number]


Patient Name / ID : CELIA WATSON  / [id number]


Exam Date : 2018 14:20:24 ( Approved )


Study Comment : 


Sex / Age : F  / 72Y





Creator : Estevan Warner MD


Dictator : Estevan Warner MD


 : 


Approver : Estevan Warner MD


Approver2 : 





Report Date : 2018 15:17:41


My Comment : 


********************************************************************************

***





HISTORY:


Chest pain.





COMPARISON:


2018. 





FINDINGS:





LUNGS:


No active pulmonary disease.





PLEURA:


No significant pleural effusion identified, no pneumothorax apparent.





CARDIOVASCULAR:


Cardiomegaly.  No evidence of acute, significant cardiovascular disease. 

Incidental Finding(s): Postoperative changes related to sternotomy. 





OSSEOUS STRUCTURES:


No significant abnormalities.





VISUALIZED UPPER ABDOMEN:


Normal.





OTHER FINDINGS:


None.





IMPRESSION:


No active disease. No significant interval change compared to the prior 

examination(s).





16:50


Pt refusing CT abd/pelvis.





Disposition





- Clinical Impression


Clinical Impression: 


 Chest pain, Gastroenteritis








- Patient ED Disposition


Is Patient to be Admitted: Yes





- Disposition


Disposition Time: 18:44


Condition: STABLE


Forms:  [facility] (English)





- Pt Status Changed To:


Hospital Disposition Of: Observation





- POA


Present On Arrival: None

## 2018-02-12 NOTE — RAD
HISTORY:

Chest pain.



COMPARISON:

02/06/2018. 



FINDINGS:



LUNGS:

No active pulmonary disease.



PLEURA:

No significant pleural effusion identified, no pneumothorax apparent.



CARDIOVASCULAR:

Cardiomegaly.  No evidence of acute, significant cardiovascular 

disease. Incidental Finding(s): Postoperative changes related to 

sternotomy. 



OSSEOUS STRUCTURES:

No significant abnormalities.



VISUALIZED UPPER ABDOMEN:

Normal.



OTHER FINDINGS:

None.



IMPRESSION:

No active disease. No significant interval change compared to the 

prior examination(s).

## 2018-02-13 LAB
BILIRUB UR-MCNC: NEGATIVE MG/DL
COLOR UR: YELLOW
GLUCOSE UR STRIP-MCNC: (no result) MG/DL
LEUKOCYTE ESTERASE UR-ACNC: (no result) LEU/UL
PH UR STRIP: 6 [PH] (ref 5–8)
PROT UR STRIP-MCNC: NEGATIVE MG/DL
RBC # UR STRIP: (no result) /UL
SP GR UR STRIP: 1.01 (ref 1–1.03)
SQUAMOUS EPITHIAL: 2 /HPF (ref 0–5)
URINE CLARITY: (no result)
URINE HYALINE CAST: (no result) /HPF (ref 0–2)
URINE NITRATE: NEGATIVE
UROBILINOGEN UR-MCNC: (no result) MG/DL (ref 0.2–1)

## 2018-02-13 RX ADMIN — Medication SCH INTLU: at 10:49

## 2018-02-13 RX ADMIN — METOPROLOL SUCCINATE SCH MG: 50 TABLET, EXTENDED RELEASE ORAL at 21:12

## 2018-02-13 RX ADMIN — IPRATROPIUM BROMIDE AND ALBUTEROL SULFATE PRN ML: .5; 3 SOLUTION RESPIRATORY (INHALATION) at 05:39

## 2018-02-13 RX ADMIN — PURIFIED WATER PRN LOZ: 99.05 LIQUID OPHTHALMIC at 20:40

## 2018-02-13 RX ADMIN — PURIFIED WATER PRN LOZ: 99.05 LIQUID OPHTHALMIC at 10:34

## 2018-02-13 RX ADMIN — ENOXAPARIN SODIUM SCH MG: 40 INJECTION SUBCUTANEOUS at 10:35

## 2018-02-13 NOTE — CARD
--------------- APPROVED REPORT --------------





EKG Measurement

Heart [id number]

VT [id number]

[id number]

[id number]

[id number]



<Conclusion>

Sinus rhythm with premature atrial complexes

Left axis deviation

Right bundle branch block

Inferior infarct, age undetermined

Abnormal ECG

## 2018-02-13 NOTE — CP.PCM.HP
<Jt Espinoza - Last Filed: 02/13/18 19:31>





History of Present Illness





- History of Present Illness


History of Present Illness: 





73 YO F with hx of HTN, CAD,CABG, COPD, HLD presents with one week of URI 

symptoms. She states she has had a productive cough, body aches, sore throat, 

vomiting and diarrhea. Patient had been taking Macrobid at home but hasn't 

helped the throat. Chest pain over the left side, non radiating.





PMD: Dr. Burnette





Present on Admission





- Present on Admission


Any Indicators Present on Admission: No





Past Patient History





- Infectious Disease


Hx of Infectious Diseases: None





- Past Medical History & Family History


Past Medical History?: Yes





- Past Social History


Smoking Status: Former Smoker





- CARDIAC


Hx Congestive Heart Failure: Yes


Hx Hypercholesterolemia: Yes


Hx Hypertension: Yes





- PULMONARY


Hx Asthma: Yes


Hx Chronic Obstructive Pulmonary Disease (COPD): Yes


Hx Pneumonia: Yes





- NEUROLOGICAL


Hx Neurological Disorder: Yes


HX Cerebrovascular Accident: Yes (x3)





- HEENT


Hx HEENT Problems: No





- RENAL


Hx Chronic Kidney Disease: No





- ENDOCRINE/METABOLIC


Hx Endocrine Disorders: No





- HEMATOLOGICAL/ONCOLOGICAL


Hx Blood Disorders: No





- INTEGUMENTARY


Hx Dermatological Problems: No





- MUSCULOSKELETAL/RHEUMATOLOGICAL


Hx Falls: Yes





- GASTROINTESTINAL


Hx Gastrointestinal Disorders: No





- GENITOURINARY/GYNECOLOGICAL


Hx Genitourinary Disorders: No





- PSYCHIATRIC


Hx Substance Use: No





- SURGICAL HISTORY


Hx Appendectomy: Yes


Hx Coronary Artery Bypass Graft: Yes (TRIPLE BYPASS. 2009)





- ANESTHESIA


Hx Anesthesia: Yes


Hx Anesthesia Reactions: No


Hx Malignant Hyperthermia: No





Meds


Allergies/Adverse Reactions: 


 Allergies











Allergy/AdvReac Type Severity Reaction Status Date / Time


 


Penicillins AdvReac  ANGIOEDEMA Verified 02/06/18 20:47














Physical Exam





- Constitutional


Appears: No Acute Distress





- Head Exam


Head Exam: NORMAL INSPECTION





- Eye Exam


Eye Exam: Normal appearance





- ENT Exam


ENT Exam: Mucous Membranes Moist


Additional comments: 





no pharyngeal erythema or tonsilar exudate





- Neck Exam


Neck exam: Positive for: Normal Inspection





- Respiratory Exam


Respiratory Exam: Clear to Auscultation Bilateral.  absent: Rales, Rhonchi, 

Wheezes





- Cardiovascular Exam


Cardiovascular Exam: REGULAR RHYTHM, +S1, +S2, Systolic Murmur





- Extremities Exam


Extremities exam: Positive for: normal inspection.  Negative for: calf 

tenderness





- Neurological Exam


Neurological exam: Alert, CN II-XII Intact, Oriented x3





- Psychiatric Exam


Psychiatric exam: Anxious





Results





- Vital Signs


Recent Vital Signs: 





 Last Vital Signs











Temp  102 F H  02/13/18 17:35


 


Pulse  92 H  02/13/18 15:47


 


Resp  20   02/13/18 15:47


 


BP  109/67   02/13/18 15:47


 


Pulse Ox  91 L  02/13/18 15:47














- Labs


Result Diagrams: 


 02/12/18 14:34





 02/12/18 14:34


Labs: 





 Laboratory Results - last 24 hr











  02/13/18 02/13/18





  01:40 04:25


 


Troponin I  < 0.0120  < 0.0120














Assessment & Plan





- Assessment and Plan (Free Text)


Assessment: 





1) Chest pain


- troponin x 3 negative


- EKG: no acute findings


- F/U with Echo





2) COPD exacerbation


- Duoneb





3) Pharyngitis


- Azithromycin





4) DVT prophylaxis


- Lovenox





<Severo Burnette - Last Filed: 02/16/18 16:29>





Results





- Vital Signs


Recent Vital Signs: 





 Last Vital Signs











Temp  98.2 F   02/16/18 16:02


 


Pulse  82   02/16/18 16:02


 


Resp  20   02/16/18 16:02


 


BP  129/71   02/16/18 16:02


 


Pulse Ox  93 L  02/16/18 16:02














- Labs


Result Diagrams: 


 02/16/18 04:57





 02/16/18 05:04


Labs: 





 Laboratory Results - last 24 hr











  02/16/18 02/16/18





  04:57 05:04


 


WBC  2.4 L 


 


RBC  4.60 


 


Hgb  12.5 


 


Hct  38.9 


 


MCV  84.5 


 


MCH  27.2 


 


MCHC  32.2 L 


 


RDW  12.4 


 


Plt Count  156 


 


Sodium   142


 


Potassium   4.7


 


Chloride   106


 


Carbon Dioxide   21 L


 


Anion Gap   20


 


BUN   16


 


Creatinine   0.7


 


Est GFR ( Amer)   > 60


 


Est GFR (Non-Af Amer)   > 60


 


Random Glucose   104


 


Calcium   9.1














Assessment & Plan





- Assessment and Plan (Free Text)


Assessment: 





Patient was personally seen and examined by me in rounds with residents.


Available labs and diagnostic data reviewed.


Case, Patient's condition and management plan discussed with residents in 

rounds. 


Agree with resident's documentation.


Plan: As ordered.





Severo Burnette MD

## 2018-02-13 NOTE — CP.PCM.CON
History of Present Illness





- History of Present Illness


History of Present Illness: 





72 year old female with hx of CAD/CABG , HTN , dylipidemia with c/o URI sx x 1 

week presenting with c/o chest pain and back pain. She has been followed by 

 for her cardiovascular care. Patient at the time of my evaluation this 

am was sleepy and provided limited information. 





Review of Systems





- Review of Systems


Systems not reviewed;Unavailable: Acuity of Condition





- Constitutional


Constitutional: As Per HPI





- EENT


Eyes: As Per HPI


Ears: As Per HPI


Nose/Mouth/Throat: As Per HPI





- Breasts


Breasts: As Per HPI





- Cardiovascular


Cardiovascular: As Per HPI





- Respiratory


Respiratory: As Per HPI





- Gastrointestinal


Gastrointestinal: As Per HPI





- Genitourinary


Genitourinary: As Per HPI





- Reproductive: Female


Reproductive:Female: As Per HPI





- Menstruation


Menstruation: As Per HPI





- Musculoskeletal


Musculoskeletal: As Per HPI





- Integumentary


Integumentary: As Per HPI





- Neurological


Neurological: As Per HPI





- Psychiatric


Psychiatric: As Per HPI





- Endocrine


Endocrine: As Per HPI





- Hematologic/Lymphatic


Hematologic: As Per HPI





Past Patient History





- Infectious Disease


Hx of Infectious Diseases: None





- Past Medical History & Family History


Past Medical History?: Yes





- Past Social History


Smoking Status: Former Smoker





- CARDIAC


Hx Congestive Heart Failure: Yes


Hx Hypercholesterolemia: Yes


Hx Hypertension: Yes





- PULMONARY


Hx Asthma: Yes


Hx Chronic Obstructive Pulmonary Disease (COPD): Yes


Hx Pneumonia: Yes





- NEUROLOGICAL


Hx Neurological Disorder: Yes


HX Cerebrovascular Accident: Yes (x3)





- HEENT


Hx HEENT Problems: No





- RENAL


Hx Chronic Kidney Disease: No





- ENDOCRINE/METABOLIC


Hx Endocrine Disorders: No





- HEMATOLOGICAL/ONCOLOGICAL


Hx Blood Disorders: No





- INTEGUMENTARY


Hx Dermatological Problems: No





- MUSCULOSKELETAL/RHEUMATOLOGICAL


Hx Falls: Yes





- GASTROINTESTINAL


Hx Gastrointestinal Disorders: No





- GENITOURINARY/GYNECOLOGICAL


Hx Genitourinary Disorders: No





- PSYCHIATRIC


Hx Substance Use: No





- SURGICAL HISTORY


Hx Appendectomy: Yes


Hx Coronary Artery Bypass Graft: Yes (TRIPLE BYPASS. 2009)





- ANESTHESIA


Hx Anesthesia: Yes


Hx Anesthesia Reactions: No


Hx Malignant Hyperthermia: No





Meds


Allergies/Adverse Reactions: 


 Allergies











Allergy/AdvReac Type Severity Reaction Status Date / Time


 


Penicillins AdvReac  ANGIOEDEMA Verified 02/06/18 20:47














- Medications


Medications: 


 Current Medications





Acetaminophen (Tylenol 325mg Tab)  650 mg PO Q6 PRN


   PRN Reason: Fever >100.4 F


   Last Admin: 02/13/18 17:35 Dose:  650 mg


Acetaminophen (Tylenol 325mg Tab)  650 mg PO Q6 PRN


   PRN Reason: Headache


   Last Admin: 02/13/18 00:24 Dose:  650 mg


Albuterol/Ipratropium (Duoneb 3 Mg/0.5 Mg (3 Ml) Ud)  3 ml INH RQ4 PRN


   PRN Reason: Shortness of Breath


   Last Admin: 02/13/18 05:39 Dose:  3 ml


Alprazolam (Xanax)  1 mg PO BID PRN


   PRN Reason: Anxiety


   Last Admin: 02/13/18 10:38 Dose:  1 mg


Aspirin (Ecotrin)  81 mg PO DAILY [facility]


   Last Admin: 02/13/18 10:38 Dose:  81 mg


Atorvastatin Calcium (Lipitor)  20 mg PO HS [facility]


Benzocaine/Menthol (Cepacol Sore Throat)  1 ruben PO Q2 PRN


   PRN Reason: Sore Throat


   Last Admin: 02/13/18 10:34 Dose:  1 ruben


Enoxaparin Sodium (Lovenox)  40 mg SC DAILY [facility]


   PRN Reason: Protocol


   Last Admin: 02/13/18 10:35 Dose:  40 mg


Azithromycin 500 mg/ Sodium (Chloride)  250 mls @ 250 mls/hr IVPB DAILY [facility]


   PRN Reason: Protocol


   Last Admin: 02/13/18 10:49 Dose:  250 mls/hr


Metoprolol Succinate (Toprol Xl)  50 mg PO [facility]


Vitamin D (Vitamin D 400 Intl Units Tab)  400 intlu PO DAILY [facility]


   Last Admin: 02/13/18 10:49 Dose:  400 intlu











Physical Exam





- Constitutional


Appears: Well





- Head Exam


Head Exam: ATRAUMATIC, NORMAL INSPECTION, NORMOCEPHALIC





- Eye Exam


Eye Exam: EOMI, Normal appearance, PERRL


Pupil Exam: NORMAL ACCOMODATION, PERRL





- ENT Exam


ENT Exam: Mucous Membranes Moist, Normal Exam





- Neck Exam


Neck exam: Positive for: Normal Inspection





- Respiratory Exam


Respiratory Exam: Clear to Auscultation Bilateral, NORMAL BREATHING PATTERN





- Cardiovascular Exam


Cardiovascular Exam: REGULAR RHYTHM, RRR, +S1, +S2, Systolic Murmur





- GI/Abdominal Exam


GI & Abdominal Exam: Normal Bowel Sounds, Soft.  absent: Tenderness





- Extremities Exam


Extremities exam: Positive for: normal inspection





- Back Exam


Back exam: NORMAL INSPECTION





- Neurological Exam


Neurological exam: Alert, CN II-XII Intact, Oriented x3, Reflexes Normal





- Psychiatric Exam


Psychiatric exam: Normal Affect, Normal Mood





- Skin


Skin Exam: Dry, Intact, Normal Color, Warm





Results





- Vital Signs


Recent Vital Signs: 


 Last Vital Signs











Temp  102 F H  02/13/18 17:35


 


Pulse  92 H  02/13/18 15:47


 


Resp  20   02/13/18 15:47


 


BP  109/67   02/13/18 15:47


 


Pulse Ox  91 L  02/13/18 15:47














- Labs


Result Diagrams: 


 02/12/18 14:34





 02/12/18 14:34


Labs: 


 Laboratory Results - last 24 hr











  02/13/18 02/13/18





  01:40 04:25


 


Troponin I  < 0.0120  < 0.0120














Assessment & Plan


(1) Chest pain


Assessment and Plan: 


ACS ruled out with serial enzymes 


will review records from 's office


echo 


telemetry 


Status: Acute   





(2) Acute bronchitis with chronic obstructive pulmonary disease (COPD)


Status: Acute   





(3) Asthma exacerbation


Status: Acute   





(4) COPD (chronic obstructive pulmonary disease)


Status: Acute   





(5) Chest wall contusion


Status: Acute   





(6) Lower extremity edema


Status: Acute

## 2018-02-14 RX ADMIN — IPRATROPIUM BROMIDE AND ALBUTEROL SULFATE PRN ML: .5; 3 SOLUTION RESPIRATORY (INHALATION) at 20:27

## 2018-02-14 RX ADMIN — ENOXAPARIN SODIUM SCH MG: 40 INJECTION SUBCUTANEOUS at 09:11

## 2018-02-14 RX ADMIN — GUAIFENESIN AND DEXTROMETHORPHAN HYDROBROMIDE SCH: 600; 30 TABLET, EXTENDED RELEASE ORAL at 18:24

## 2018-02-14 RX ADMIN — Medication SCH INTLU: at 09:11

## 2018-02-14 RX ADMIN — METOPROLOL SUCCINATE SCH MG: 50 TABLET, EXTENDED RELEASE ORAL at 22:05

## 2018-02-14 RX ADMIN — GUAIFENESIN AND DEXTROMETHORPHAN HYDROBROMIDE SCH TAB: 600; 30 TABLET, EXTENDED RELEASE ORAL at 22:06

## 2018-02-14 NOTE — CP.PCM.PCO
Addendum


Addendum: 





02/14/18 00:18


Im called and notified that patient fell when trying to go to the bathroom and 

she is also requesting some medication for cough. 


Patient seen and examined bedside, who states that she wants something for her 

cough and she refused duoneb treatment. She reports that she tripped with her 

gown while try to go the the bathroom and she fell on her right knee and now 

she reports  not knee pain.. Denies dizziness, headache, weakness, hx/o knee 

trauma or surgery(says that she was not a candidate for knee replacement due to 

her lungs conditions. patient does not want to be examined her knee, but 

finally accepted. 


PE: Right knee mild swelling over patellar area, no erythema, no limited ROM. 

no appreciated joint effusion now. 


     Left Knee: normal


   LungsL CTA, no rhonchi, wheezing, rales 


A: Fall. right knee contusion.


    Influenza


P: c/w treatment tamiflu, Motrin 600 mg PRN pain


   - right knee XR.


  -Phenergan/Codeine 10 ml Q 6h PRN cough. 


02/14/18 00:24

## 2018-02-14 NOTE — CARD
--------------- APPROVED REPORT --------------





EXAM: Two-dimensional and M-mode echocardiogram with Doppler and 

color Doppler.



Other Information 

Quality : GoodRhythm : NSR



INDICATION

Chest Pain 



2D DIMENSIONS 

IVSd1.18   (0.7-1.1cm)LVDd4.37   (3.9-5.9cm)

LVOT Diameter2.64   (1.8-2.4cm)PWd0.97   (0.7-1.1cm)

IVSs1.52   (0.8-1.2cm)LVDs2.69   (2.5-4.0cm)

FS (%) 38.4   %PWs1.22   (0.8-1.2cm)



M-Mode DIMENSIONS 

Left Atrium (MM)4.03   (2.5-4.0cm)IVSd1.35   (0.7-1.1cm)

Aortic Root3.38   (2.2-3.7cm)LVDd4.53   (4.0-5.6cm)

Aortic Cusp Exc.2.12   (1.5-2.0cm)PWd1.35   (0.7-1.1cm)

IVSs1.71   cmFS (%) 42   %

LVDs2.62   (2.0-3.8cm)PWs1.68   cm



Mitral Valve

MV E Ouhasptq65.9cm/sMV DECEL [id number] A Rqzuxhwk80.9cm/s

MV LOZ90pvF/A ratio0.9MVA (PHT)4.47cm2



TDI

Lateral E' Peak V8.47cm/sMedial E' Peak V6.11cm/sE/Lateral E'7.5

E/Medial E'10.5



Pulmonary Valve

PV Peak Elbzvkcn372.6cm/s



Tricuspid Valve

TR Peak Ohfibrew972rg/sRAP QKPQGFLO78pgHtHU Peak Gr.20mmHg

[id number]



 LEFT VENTRICLE 

The left ventricle is normal in size.

There is normal left ventricular wall thickness.

The left ventricular function is normal.

The left ventricular ejection fraction is - 70%.

There is normal LV segmental wall motion.

Transmitral Doppler flow pattern is Grade I-abnormal relaxation 

pattern.

No left ventricle thrombus noted on this study.

There is no ventricular septal defect visualized.

There is no left ventricular aneurysm.

There is no mass noted in the left ventricle.



 RIGHT VENTRICLE 

The right ventricle is normal size.

There is normal right ventricular wall thickness.

The right ventricular systolic function is normal.



 ATRIA 

The left atrium is mildly dilated.

There is no thrombus suspected in the left atrium.

The right atrium size is normal.

The interatrial septum is intact with no evidence for an atrial 

septal defect.



 AORTIC VALVE 

The aortic valve is mildly thickened.

No aortic regurgitation is present.

There is no aortic valvular stenosis.



 MITRAL VALVE 

The mitral valve is normal in structure.

There is no evidence of mitral valve prolapse.

There is no mitral valve stenosis.

There is no mitral valve regurgitation noted.



 TRICUSPID VALVE 

The tricuspid valve is normal in structure.

There is mild tricuspid regurgitation.

Right ventricular systolic pressure is estimated at 33 mmHg.

There is no tricuspid valve prolapse or vegetation.

There is no tricuspid valve stenosis.



 PULMONIC VALVE 

The pulmonary valve is normal in structure.

There is at least mild pulmonic valvular regurgitation.



 GREAT VESSELS 

The aortic root is normal in size.

The IVC is normal in size and collapses >50% with inspiration.



 PERICARDIAL EFFUSION 

There is a small anterior and posterior echo free space that hwas 

present on a previous study.

There is no pleural effusion.



<Conclusion>

The left ventricle is normal in size and wall thickness.

The left ventricular function is normal.

The left ventricular ejection fraction is - 70%.

The left atrium is mildly dilated.

The aortic valve is mildly thickened but not stenotic.

The mitral and tricuspid valves are normal.

There is mild tricuspid regurgitation.

## 2018-02-14 NOTE — CP.PCM.PN
<Jt Espinoza - Last Filed: 02/14/18 17:24>





Subjective





- Date & Time of Evaluation


Date of Evaluation: 02/14/18


Time of Evaluation: 17:13





- Subjective


Subjective: 





- Patient seen at bedside, states she fell last night while going to the 

bathroom however did not hit her head or lose consiousness. Denies any pain 

from the fall. She continues to have a productive cough. States her body feels 

cold. She was found to be Flu positive





Objective





- Vital Signs/Intake and Output


Vital Signs (last 24 hours): 


 











Temp Pulse Resp BP Pulse Ox


 


 98.8 F   80   20   111/72   95 


 


 02/14/18 16:37  02/14/18 16:37  02/14/18 16:37  02/14/18 16:37  02/14/18 16:37











- Medications


Medications: 


 Current Medications





Acetaminophen (Tylenol 325mg Tab)  650 mg PO Q6 PRN


   PRN Reason: Fever >100.4 F


   Last Admin: 02/13/18 17:35 Dose:  650 mg


Acetaminophen (Tylenol 325mg Tab)  650 mg PO Q6 PRN


   PRN Reason: Headache


   Last Admin: 02/13/18 00:24 Dose:  650 mg


Albuterol/Ipratropium (Duoneb 3 Mg/0.5 Mg (3 Ml) Ud)  3 ml INH RQ4 PRN


   PRN Reason: Shortness of Breath


   Last Admin: 02/13/18 05:39 Dose:  3 ml


Alprazolam (Xanax)  1 mg PO BID PRN


   PRN Reason: Anxiety


   Last Admin: 02/14/18 09:16 Dose:  1 mg


Aspirin (Ecotrin)  81 mg PO DAILY [facility]


   Last Admin: 02/14/18 09:11 Dose:  81 mg


Atorvastatin Calcium (Lipitor)  20 mg PO HS [facility]


   Last Admin: 02/13/18 22:30 Dose:  20 mg


Benzocaine/Menthol (Cepacol Sore Throat)  1 ruben PO Q2 PRN


   PRN Reason: Sore Throat


   Last Admin: 02/13/18 20:40 Dose:  1 ruben


Enoxaparin Sodium (Lovenox)  40 mg SC DAILY [facility]


   PRN Reason: Protocol


   Last Admin: 02/14/18 09:11 Dose:  40 mg


Azithromycin 500 mg/ Sodium (Chloride)  250 mls @ 250 mls/hr IVPB DAILY [facility]


   PRN Reason: Protocol


   Last Admin: 02/14/18 09:16 Dose:  250 mls/hr


Ibuprofen (Motrin Tab)  400 mg PO Q6 PRN


   PRN Reason: Pain, moderate (4-7)


Metoprolol Succinate (Toprol Xl)  50 mg PO HS [facility]


   Last Admin: 02/13/18 21:12 Dose:  50 mg


Oseltamivir Phosphate (Tamiflu Cap)  75 mg PO BID [facility]


   PRN Reason: Protocol


   Last Admin: 02/14/18 16:20 Dose:  75 mg


Promethazine HCl/Codeine (Phenergan/Codeine Oral Syrup)  10 ml PO Q6 PRN


   PRN Reason: Cough


   Last Admin: 02/14/18 00:47 Dose:  10 ml


Vitamin D (Vitamin D 400 Intl Units Tab)  400 intlu PO DAILY [facility]


   Last Admin: 02/14/18 09:11 Dose:  400 intlu











- Labs


Labs: 


 





 02/12/18 14:34 





 02/12/18 14:34 





 











PT  27.1 Seconds (9.8-13.1)  H  02/12/18  14:34    


 


INR  1.0  (0.9-1.2)   02/12/18  14:34    


 


APTT  11.5 Seconds (25.6-37.1)  L  02/12/18  14:34    














- Head Exam


Head Exam: NORMAL INSPECTION.  absent: ATRAUMATIC





- Respiratory Exam


Respiratory Exam: Clear to Ausculation Bilateral, NORMAL BREATHING PATTERN.  

absent: Rhonchi, Wheezes





- Cardiovascular Exam


Cardiovascular Exam: REGULAR RHYTHM, +S1, +S2





- GI/Abdominal Exam


GI & Abdominal Exam: Soft, Normal Bowel Sounds.  absent: Tenderness





- Extremities Exam


Extremities Exam: Normal Inspection.  absent: Calf Tenderness


Additional comments: 





Slight tenderness over right knee, but no erythema noted. Normal ROM





- Back Exam


Back Exam: NORMAL INSPECTION





- Neurological Exam


Neurological Exam: Alert, Awake, CN II-XII Intact, Oriented x3





- Psychiatric Exam


Psychiatric exam: Anxious





- Skin


Skin Exam: Normal Color, Warm





Assessment and Plan





- Assessment and Plan (Free Text)


Assessment: 





1) Chest pain


- troponin x 3 negative


- EKG: no acute findings


- Echo perserved EF, left atrium is dilated, mild MR





2) COPD exacerbation


- Duoneb





3) Pharyngitis


- Azithromycin





3) Influenza


- Flu positive


- Start tamiflu





4) DVT prophylaxis


- Lovenox








<Burnette,Severo K - Last Filed: 02/16/18 16:43>





Objective





- Vital Signs/Intake and Output


Vital Signs (last 24 hours): 


 











Temp Pulse Resp BP Pulse Ox


 


 98.2 F   82   20   129/71   93 L


 


 02/16/18 16:02  02/16/18 16:02  02/16/18 16:02  02/16/18 16:02  02/16/18 16:02








Intake and Output: 


 











 02/16/18 02/16/18





 06:59 18:59


 


Intake Total 1200 


 


Balance 1200 














- Medications


Medications: 


 Current Medications





Acetaminophen (Tylenol 325mg Tab)  650 mg PO Q6 PRN


   PRN Reason: Fever >100.4 F


   Last Admin: 02/13/18 17:35 Dose:  650 mg


Acetaminophen (Tylenol 325mg Tab)  650 mg PO Q6 PRN


   PRN Reason: Headache


   Last Admin: 02/13/18 00:24 Dose:  650 mg


Albuterol/Ipratropium (Duoneb 3 Mg/0.5 Mg (3 Ml) Ud)  3 ml INH RQ4 PRN


   PRN Reason: Shortness of Breath


   Last Admin: 02/14/18 20:27 Dose:  3 ml


Alprazolam (Xanax)  1 mg PO BID PRN


   PRN Reason: Anxiety


   Last Admin: 02/15/18 21:15 Dose:  1 mg


Aspirin (Ecotrin)  81 mg PO DAILY [facility]


   Last Admin: 02/16/18 09:06 Dose:  81 mg


Atorvastatin Calcium (Lipitor)  20 mg PO HS [facility]


   Last Admin: 02/15/18 21:11 Dose:  20 mg


Benzocaine/Menthol (Cepacol Sore Throat)  1 ruben PO Q2 PRN


   PRN Reason: Sore Throat


   Last Admin: 02/16/18 09:06 Dose:  1 ruben


Enoxaparin Sodium (Lovenox)  40 mg SC DAILY [facility]


   PRN Reason: Protocol


   Last Admin: 02/16/18 09:07 Dose:  40 mg


Azithromycin 500 mg/ Sodium (Chloride)  250 mls @ 250 mls/hr IVPB DAILY [facility]


   PRN Reason: Protocol


   Last Admin: 02/16/18 11:48 Dose:  250 mls/hr


Ciprofloxacin (Cipro 400mg/200ml Dsw)  400 mg in 200 mls @ 200 mls/hr IVPB Q12 

[facility]


   PRN Reason: Protocol


   Last Admin: 02/16/18 09:05 Dose:  200 mls/hr


Ibuprofen (Motrin Tab)  400 mg PO Q6 PRN


   PRN Reason: Pain, moderate (4-7)


Metoprolol Succinate (Toprol Xl)  50 mg PO HS [facility]


   Last Admin: 02/15/18 21:11 Dose:  50 mg


Oseltamivir Phosphate (Tamiflu Cap)  75 mg PO BID [facility]


   PRN Reason: Protocol


   Last Admin: 02/16/18 09:08 Dose:  75 mg


Promethazine HCl/Dextromethorphan (Phenergan Dm Syrup)  5 ml PO Q6 PRN


   PRN Reason: Cough


   Last Admin: 02/16/18 09:08 Dose:  5 ml


Saccharomyces Boulardii (Florastor)  250 mg PO BID [facility]


   Last Admin: 02/16/18 09:06 Dose:  250 mg


Vitamin D (Vitamin D 400 Intl Units Tab)  400 intlu PO DAILY [facility]


   Last Admin: 02/16/18 09:09 Dose:  400 intlu











- Labs


Labs: 


 





 02/16/18 04:57 





 02/16/18 05:04 





 











PT  27.1 Seconds (9.8-13.1)  H  02/12/18  14:34    


 


INR  1.0  (0.9-1.2)   02/12/18  14:34    


 


APTT  11.5 Seconds (25.6-37.1)  L  02/12/18  14:34    














Assessment and Plan





- Assessment and Plan (Free Text)


Assessment: 


Patient was personally seen and examined by me in rounds with residents.


Available labs and diagnostic data reviewed.


Case, Patient's condition and management plan discussed with residents in 

rounds. 


Agree with resident's documentation.


Plan: As ordered.





Severo Burnette MD

## 2018-02-15 LAB
BILIRUB UR-MCNC: NEGATIVE MG/DL
BUN SERPL-MCNC: 15 MG/DL (ref 7–17)
CALCIUM SERPL-MCNC: 9.1 MG/DL (ref 8.4–10.2)
COLOR UR: YELLOW
ERYTHROCYTE [DISTWIDTH] IN BLOOD BY AUTOMATED COUNT: 12.4 % (ref 11.5–14.5)
GFR NON-AFRICAN AMERICAN: > 60
GLUCOSE UR STRIP-MCNC: (no result) MG/DL
HGB BLD-MCNC: 12.7 G/DL (ref 12–16)
LEUKOCYTE ESTERASE UR-ACNC: (no result) LEU/UL
MCH RBC QN AUTO: 27.2 PG (ref 27–31)
MCHC RBC AUTO-ENTMCNC: 32.1 G/DL (ref 33–37)
MCV RBC AUTO: 84.6 FL (ref 81–99)
PH UR STRIP: 5 [PH] (ref 5–8)
PLATELET # BLD: 144 K/UL (ref 130–400)
PROT UR STRIP-MCNC: NEGATIVE MG/DL
RBC # BLD AUTO: 4.66 MIL/UL (ref 3.8–5.2)
RBC # UR STRIP: (no result) /UL
SP GR UR STRIP: 1.02 (ref 1–1.03)
SQUAMOUS EPITHIAL: 3 /HPF (ref 0–5)
URINE CLARITY: (no result)
URINE HYALINE CAST: (no result) /HPF (ref 0–2)
URINE NITRATE: NEGATIVE
UROBILINOGEN UR-MCNC: (no result) MG/DL (ref 0.2–1)
WBC # BLD AUTO: 2.5 K/UL (ref 4.8–10.8)

## 2018-02-15 RX ADMIN — PURIFIED WATER PRN LOZ: 99.05 LIQUID OPHTHALMIC at 09:16

## 2018-02-15 RX ADMIN — METOPROLOL SUCCINATE SCH MG: 50 TABLET, EXTENDED RELEASE ORAL at 21:11

## 2018-02-15 RX ADMIN — Medication SCH MG: at 17:02

## 2018-02-15 RX ADMIN — Medication SCH INTLU: at 09:18

## 2018-02-15 RX ADMIN — CIPROFLOXACIN SCH MLS/HR: 2 INJECTION, SOLUTION INTRAVENOUS at 20:53

## 2018-02-15 RX ADMIN — PROMETHAZINE HYDROCHLORIDE AND DEXTROMETHORPHAN HYDROBROMIDE PRN ML: 15; 6.25 SYRUP ORAL at 17:03

## 2018-02-15 RX ADMIN — PROMETHAZINE HYDROCHLORIDE AND DEXTROMETHORPHAN HYDROBROMIDE PRN ML: 15; 6.25 SYRUP ORAL at 11:07

## 2018-02-15 RX ADMIN — GUAIFENESIN AND DEXTROMETHORPHAN HYDROBROMIDE SCH: 600; 30 TABLET, EXTENDED RELEASE ORAL at 09:18

## 2018-02-15 RX ADMIN — ENOXAPARIN SODIUM SCH MG: 40 INJECTION SUBCUTANEOUS at 09:17

## 2018-02-15 NOTE — CP.PCM.PN
<Jt Espinoza - Last Filed: 02/15/18 11:31>





Subjective





- Date & Time of Evaluation


Date of Evaluation: 02/15/18


Time of Evaluation: 08:49





- Subjective


Subjective: 





- Patient continues to complain of throat irritation and cough. Patient is 

experiencing body aches most likely secondary to the flu, however has remained 

afebrile overnight. 





Objective





- Vital Signs/Intake and Output


Vital Signs (last 24 hours): 


 











Temp Pulse Resp BP Pulse Ox


 


 97.9 F   82   20   109/79   95 


 


 02/15/18 08:00  02/15/18 08:00  02/15/18 08:00  02/15/18 08:00  02/15/18 08:00











- Medications


Medications: 


 Current Medications





Acetaminophen (Tylenol 325mg Tab)  650 mg PO Q6 PRN


   PRN Reason: Fever >100.4 F


   Last Admin: 02/13/18 17:35 Dose:  650 mg


Acetaminophen (Tylenol 325mg Tab)  650 mg PO Q6 PRN


   PRN Reason: Headache


   Last Admin: 02/13/18 00:24 Dose:  650 mg


Albuterol/Ipratropium (Duoneb 3 Mg/0.5 Mg (3 Ml) Ud)  3 ml INH RQ4 PRN


   PRN Reason: Shortness of Breath


   Last Admin: 02/14/18 20:27 Dose:  3 ml


Alprazolam (Xanax)  1 mg PO BID PRN


   PRN Reason: Anxiety


   Last Admin: 02/14/18 09:16 Dose:  1 mg


Aspirin (Ecotrin)  81 mg PO DAILY [facility]


   Last Admin: 02/14/18 09:11 Dose:  81 mg


Atorvastatin Calcium (Lipitor)  20 mg PO HS [facility]


   Last Admin: 02/14/18 22:06 Dose:  20 mg


Benzocaine/Menthol (Cepacol Sore Throat)  1 ruben PO Q2 PRN


   PRN Reason: Sore Throat


   Last Admin: 02/13/18 20:40 Dose:  1 ruben


Enoxaparin Sodium (Lovenox)  40 mg SC DAILY [facility]


   PRN Reason: Protocol


   Last Admin: 02/14/18 09:11 Dose:  40 mg


Guaifenesin/Dextromethorphan (Mucinex-Dm 600-30 Mg)  1 tab PO BID [facility]


   Last Admin: 02/14/18 22:06 Dose:  1 tab


Azithromycin 500 mg/ Sodium (Chloride)  250 mls @ 250 mls/hr IVPB DAILY [facility]


   PRN Reason: Protocol


   Last Admin: 02/14/18 09:16 Dose:  250 mls/hr


Ibuprofen (Motrin Tab)  400 mg PO Q6 PRN


   PRN Reason: Pain, moderate (4-7)


Metoprolol Succinate (Toprol Xl)  50 mg PO HS [facility]


   Last Admin: 02/14/18 22:05 Dose:  50 mg


Oseltamivir Phosphate (Tamiflu Cap)  75 mg PO BID [facility]


   PRN Reason: Protocol


   Last Admin: 02/14/18 16:20 Dose:  75 mg


Vitamin D (Vitamin D 400 Intl Units Tab)  400 intlu PO DAILY [facility]


   Last Admin: 02/14/18 09:11 Dose:  400 intlu











- Labs


Labs: 


 





 02/15/18 05:35 





 02/15/18 05:35 





 











PT  27.1 Seconds (9.8-13.1)  H  02/12/18  14:34    


 


INR  1.0  (0.9-1.2)   02/12/18  14:34    


 


APTT  11.5 Seconds (25.6-37.1)  L  02/12/18  14:34    














- Constitutional


Appears: No Acute Distress





- Head Exam


Head Exam: NORMAL INSPECTION





- Eye Exam


Eye Exam: Normal appearance





- Respiratory Exam


Respiratory Exam: Clear to Ausculation Bilateral, NORMAL BREATHING PATTERN.  

absent: Rhonchi, Wheezes





- Cardiovascular Exam


Cardiovascular Exam: REGULAR RHYTHM, +S1, +S2





- GI/Abdominal Exam


GI & Abdominal Exam: Soft, Normal Bowel Sounds.  absent: Tenderness





- Extremities Exam


Extremities Exam: Full ROM, Normal Inspection





- Neurological Exam


Neurological Exam: Alert, Awake, Oriented x3





- Skin


Skin Exam: Normal Color, Warm





Assessment and Plan





- Assessment and Plan (Free Text)


Assessment: 


1) Chest pain


- troponin x 3 negative


- EKG: no acute findings


- Echo perserved EF, left atrium is dilated, mild MR





2) COPD exacerbation


- Duoneb





3) Pharyngitis


- C/W Azithromycin





3) Influenza


- Flu positive


- C/W  tamiflu





4) DVT prophylaxis


- Lovenox














<Earl Burnetteak K - Last Filed: 02/16/18 16:48>





Objective





- Vital Signs/Intake and Output


Vital Signs (last 24 hours): 


 











Temp Pulse Resp BP Pulse Ox


 


 98.2 F   82   20   129/71   93 L


 


 02/16/18 16:02  02/16/18 16:02  02/16/18 16:02  02/16/18 16:02  02/16/18 16:02








Intake and Output: 


 











 02/16/18 02/16/18





 06:59 18:59


 


Intake Total 1200 


 


Balance 1200 














- Medications


Medications: 


 Current Medications





Acetaminophen (Tylenol 325mg Tab)  650 mg PO Q6 PRN


   PRN Reason: Fever >100.4 F


   Last Admin: 02/13/18 17:35 Dose:  650 mg


Acetaminophen (Tylenol 325mg Tab)  650 mg PO Q6 PRN


   PRN Reason: Headache


   Last Admin: 02/13/18 00:24 Dose:  650 mg


Albuterol/Ipratropium (Duoneb 3 Mg/0.5 Mg (3 Ml) Ud)  3 ml INH RQ4 PRN


   PRN Reason: Shortness of Breath


   Last Admin: 02/14/18 20:27 Dose:  3 ml


Alprazolam (Xanax)  1 mg PO BID PRN


   PRN Reason: Anxiety


   Last Admin: 02/15/18 21:15 Dose:  1 mg


Aspirin (Ecotrin)  81 mg PO DAILY [facility]


   Last Admin: 02/16/18 09:06 Dose:  81 mg


Atorvastatin Calcium (Lipitor)  20 mg PO HS [facility]


   Last Admin: 02/15/18 21:11 Dose:  20 mg


Benzocaine/Menthol (Cepacol Sore Throat)  1 ruben PO Q2 PRN


   PRN Reason: Sore Throat


   Last Admin: 02/16/18 09:06 Dose:  1 ruben


Enoxaparin Sodium (Lovenox)  40 mg SC DAILY [facility]


   PRN Reason: Protocol


   Last Admin: 02/16/18 09:07 Dose:  40 mg


Azithromycin 500 mg/ Sodium (Chloride)  250 mls @ 250 mls/hr IVPB DAILY [facility]


   PRN Reason: Protocol


   Last Admin: 02/16/18 11:48 Dose:  250 mls/hr


Ciprofloxacin (Cipro 400mg/200ml Dsw)  400 mg in 200 mls @ 200 mls/hr IVPB Q12 

[facility]


   PRN Reason: Protocol


   Last Admin: 02/16/18 09:05 Dose:  200 mls/hr


Ibuprofen (Motrin Tab)  400 mg PO Q6 PRN


   PRN Reason: Pain, moderate (4-7)


Metoprolol Succinate (Toprol Xl)  50 mg PO HS [facility]


   Last Admin: 02/15/18 21:11 Dose:  50 mg


Oseltamivir Phosphate (Tamiflu Cap)  75 mg PO BID [facility]


   PRN Reason: Protocol


   Last Admin: 02/16/18 09:08 Dose:  75 mg


Promethazine HCl/Dextromethorphan (Phenergan Dm Syrup)  5 ml PO Q6 PRN


   PRN Reason: Cough


   Last Admin: 02/16/18 09:08 Dose:  5 ml


Saccharomyces Boulardii (Florastor)  250 mg PO BID [facility]


   Last Admin: 02/16/18 09:06 Dose:  250 mg


Vitamin D (Vitamin D 400 Intl Units Tab)  400 intlu PO DAILY [facility]


   Last Admin: 02/16/18 09:09 Dose:  400 intlu











- Labs


Labs: 


 





 02/16/18 04:57 





 02/16/18 05:04 





 











PT  27.1 Seconds (9.8-13.1)  H  02/12/18  14:34    


 


INR  1.0  (0.9-1.2)   02/12/18  14:34    


 


APTT  11.5 Seconds (25.6-37.1)  L  02/12/18  14:34    














Assessment and Plan





- Assessment and Plan (Free Text)


Assessment: 


Patient was personally seen and examined by me in rounds with residents.


Available labs and diagnostic data reviewed.


Case, Patient's condition and management plan discussed with residents in 

rounds. 


Agree with resident's documentation.


Plan: As ordered.





Severo Burnette MD

## 2018-02-15 NOTE — CP.PCM.PN
Subjective





- Date & Time of Evaluation


Date of Evaluation: 02/14/18


Time of Evaluation: 16:00





- Subjective


Subjective: 





more awake and responsive


flu +ve 





Objective





- Vital Signs/Intake and Output


Vital Signs (last 24 hours): 


 











Temp Pulse Resp BP Pulse Ox


 


 98.9 F   80   18   108/72   96 


 


 02/15/18 16:25  02/15/18 16:25  02/15/18 16:25  02/15/18 16:25  02/15/18 16:25











- Medications


Medications: 


 Current Medications





Acetaminophen (Tylenol 325mg Tab)  650 mg PO Q6 PRN


   PRN Reason: Fever >100.4 F


   Last Admin: 02/13/18 17:35 Dose:  650 mg


Acetaminophen (Tylenol 325mg Tab)  650 mg PO Q6 PRN


   PRN Reason: Headache


   Last Admin: 02/13/18 00:24 Dose:  650 mg


Albuterol/Ipratropium (Duoneb 3 Mg/0.5 Mg (3 Ml) Ud)  3 ml INH RQ4 PRN


   PRN Reason: Shortness of Breath


   Last Admin: 02/14/18 20:27 Dose:  3 ml


Alprazolam (Xanax)  1 mg PO BID PRN


   PRN Reason: Anxiety


   Last Admin: 02/14/18 09:16 Dose:  1 mg


Aspirin (Ecotrin)  [facility] PO [facility]ILY [facility]


   Last Admin: 02/15/18 09:16 Dose:  81 mg


Atorvastatin Calcium (Lipitor)  20 mg PO HS [facility]


   Last Admin: 02/14/18 22:06 Dose:  20 mg


Benzocaine/Menthol (Cepacol Sore Throat)  1 ruben PO Q2 PRN


   PRN Reason: Sore Throat


   Last Admin: 02/15/18 09:16 Dose:  1 ruben


Enoxaparin Sodium (Lovenox)  40 mg SC DAILY [facility]


   PRN Reason: Protocol


   Last Admin: 02/15/18 09:17 Dose:  40 mg


Azithromycin 500 mg/ Sodium (Chloride)  250 mls @ 250 mls/hr IVPB DAILY [facility]


   PRN Reason: Protocol


   Last Admin: 02/15/18 09:19 Dose:  250 mls/hr


Ciprofloxacin (Cipro 400mg/200ml Dsw)  400 mg in 200 mls @ 200 mls/hr IVPB Q12 

[facility]


   PRN Reason: Protocol


Ibuprofen (Motrin Tab)  400 mg PO Q6 PRN


   PRN Reason: Pain, moderate (4-7)


Metoprolol Succinate (Toprol Xl)  50 mg PO HS [facility]


   Last Admin: 02/14/18 22:05 Dose:  50 mg


Oseltamivir Phosphate (Tamiflu Cap)  75 mg PO BID [facility]


   PRN Reason: Protocol


   Last Admin: 02/15/18 09:18 Dose:  75 mg


Promethazine HCl/Dextromethorphan (Phenergan Dm Syrup)  5 ml PO Q6 PRN


   PRN Reason: Cough


   Last Admin: 02/15/18 11:07 Dose:  5 ml


Saccharomyces Boulardii (Florastor)  250 mg PO BID [facility]


Vitamin D (Vitamin D 400 Intl Units Tab)  400 intlu PO DAILY [facility]


   Last Admin: 02/15/18 09:18 Dose:  400 intlu











- Labs


Labs: 


 





 02/15/18 05:35 





 02/15/18 05:35 





 











PT  27.1 Seconds (9.8-13.1)  H  02/12/18  14:34    


 


INR  1.0  (0.9-1.2)   02/12/18  14:34    


 


APTT  11.5 Seconds (25.6-37.1)  L  02/12/18  14:34    














- Constitutional


Appears: Well





- Head Exam


Head Exam: ATRAUMATIC, NORMAL INSPECTION, NORMOCEPHALIC





- Eye Exam


Eye Exam: EOMI, Normal appearance, PERRL


Pupil Exam: NORMAL ACCOMODATION, PERRL





- ENT Exam


ENT Exam: Mucous Membranes Moist, Normal Exam





- Neck Exam


Neck Exam: Full ROM, Normal Inspection.  absent: Lymphadenopathy





- Respiratory Exam


Respiratory Exam: Clear to Ausculation Bilateral, NORMAL BREATHING PATTERN





- Cardiovascular Exam


Cardiovascular Exam: REGULAR RHYTHM, +S1, +S2, Murmur





- GI/Abdominal Exam


GI & Abdominal Exam: Soft, Normal Bowel Sounds.  absent: Tenderness





- Extremities Exam


Extremities Exam: Full ROM, Normal Capillary Refill, Normal Inspection.  absent

: Joint Swelling, Pedal Edema





- Back Exam


Back Exam: NORMAL INSPECTION





- Neurological Exam


Neurological Exam: Alert, Awake, CN II-XII Intact, Oriented x3





- Psychiatric Exam


Psychiatric exam: Normal Affect, Normal Mood





- Skin


Skin Exam: Dry, Intact, Normal Color, Warm





Assessment and Plan


(1) CAD (coronary artery disease) of artery bypass graft


Assessment & Plan: 


stable 


cont asa, statins, bb 


Status: Acute   





(2) Chest pain


Assessment & Plan: 


pleuritic


recent stress test -ve 


Status: Acute   





(3) Acute bronchitis with chronic obstructive pulmonary disease (COPD)


Status: Acute   





(4) Asthma exacerbation


Status: Acute   





(5) COPD (chronic obstructive pulmonary disease)


Status: Acute   





(6) Chest wall contusion


Status: Acute   





(7) Lower extremity edema


Status: Acute

## 2018-02-15 NOTE — RAD
HISTORY:

Follow-up.



COMPARISON:

02/12/2018.



TECHNIQUE:

Chest PA and lateral



FINDINGS:



LUNGS:

Hyperinflation, manifestations of COPD. No active pulmonary disease.



PLEURA:

No significant pleural effusion identified. No pneumothorax apparent.



CARDIOVASCULAR:

Cardiomegaly.  No evidence of acute, significant cardiovascular 

disease. 



OSSEOUS STRUCTURES:

No significant abnormalities.



VISUALIZED UPPER ABDOMEN:

Normal.



OTHER FINDINGS:

None.



IMPRESSION:

No active disease. No significant interval change compared to the 

prior examination(s).

## 2018-02-15 NOTE — CP.PCM.PN
Subjective





- Date & Time of Evaluation


Date of Evaluation: 02/15/18


Time of Evaluation: 16:35





- Subjective


Subjective: 





feeling much better today 


still c/o intermittent left sided CP sharp in nature 





Objective





- Vital Signs/Intake and Output


Vital Signs (last 24 hours): 


 











Temp Pulse Resp BP Pulse Ox


 


 98.9 F   80   18   108/72   96 


 


 02/15/18 16:25  02/15/18 16:25  02/15/18 16:25  02/15/18 16:25  02/15/18 16:25











- Medications


Medications: 


 Current Medications





Acetaminophen (Tylenol 325mg Tab)  650 mg PO Q6 PRN


   PRN Reason: Fever >100.4 F


   Last Admin: 02/13/18 17:35 Dose:  650 mg


Acetaminophen (Tylenol 325mg Tab)  650 mg PO Q6 PRN


   PRN Reason: Headache


   Last Admin: 02/13/18 00:24 Dose:  650 mg


Albuterol/Ipratropium (Duoneb 3 Mg/0.5 Mg (3 Ml) Ud)  3 ml INH RQ4 PRN


   PRN Reason: Shortness of Breath


   Last Admin: 02/14/18 20:27 Dose:  3 ml


Alprazolam (Xanax)  1 mg PO BID PRN


   PRN Reason: Anxiety


   Last Admin: 02/14/18 09:16 Dose:  1 mg


Aspirin (Ecotrin)  81 mg PO DAILY [facility]


   Last Admin: 02/15/18 09:16 Dose:  81 mg


Atorvastatin Calcium (Lipitor)  20 mg PO HS [facility]


   Last Admin: 02/14/18 22:06 Dose:  20 mg


Benzocaine/Menthol (Cepacol Sore Throat)  1 ruben PO Q2 PRN


   PRN Reason: Sore Throat


   Last Admin: 02/15/18 09:16 Dose:  1 ruben


Enoxaparin Sodium (Lovenox)  40 mg SC DAILY [facility]


   PRN Reason: Protocol


   Last Admin: 02/15/18 09:17 Dose:  40 mg


Azithromycin 500 mg/ Sodium (Chloride)  250 mls @ 250 mls/hr IVPB DAILY [facility]


   PRN Reason: Protocol


   Last Admin: 02/15/18 09:19 Dose:  250 mls/hr


Ciprofloxacin (Cipro 400mg/200ml Dsw)  400 mg in 200 mls @ 200 mls/hr IVPB Q12 

[facility]


   PRN Reason: Protocol


Ibuprofen (Motrin Tab)  400 mg PO Q6 PRN


   PRN Reason: Pain, moderate (4-7)


Metoprolol Succinate (Toprol Xl)  50 mg PO HS [facility]


   Last Admin: 02/14/18 22:05 Dose:  50 mg


Oseltamivir Phosphate (Tamiflu Cap)  75 mg PO BID [facility]


   PRN Reason: Protocol


   Last Admin: 02/15/18 09:18 Dose:  75 mg


Promethazine HCl/Dextromethorphan (Phenergan Dm Syrup)  5 ml PO Q6 PRN


   PRN Reason: Cough


   Last Admin: 02/15/18 11:07 Dose:  5 ml


Saccharomyces Boulardii (Florastor)  250 mg PO BID [facility]


Vitamin D (Vitamin D 400 Intl Units Tab)  400 intlu PO DAILY [facility]


   Last Admin: 02/15/18 09:18 Dose:  400 intlu











- Labs


Labs: 


 





 02/15/18 05:35 





 02/15/18 05:35 





 











PT  27.1 Seconds (9.8-13.1)  H  02/12/18  14:34    


 


INR  1.0  (0.9-1.2)   02/12/18  14:34    


 


APTT  11.5 Seconds (25.6-37.1)  L  02/12/18  14:34    














- Constitutional


Appears: Well





- Head Exam


Head Exam: ATRAUMATIC, NORMAL INSPECTION, NORMOCEPHALIC





- Eye Exam


Eye Exam: EOMI, Normal appearance, PERRL


Pupil Exam: NORMAL ACCOMODATION, PERRL





- ENT Exam


ENT Exam: Mucous Membranes Moist, Normal Exam





- Neck Exam


Neck Exam: Full ROM, Normal Inspection.  absent: Lymphadenopathy





- Respiratory Exam


Respiratory Exam: Clear to Ausculation Bilateral, NORMAL BREATHING PATTERN





- Cardiovascular Exam


Cardiovascular Exam: REGULAR RHYTHM, +S1, +S2, Murmur





- GI/Abdominal Exam


GI & Abdominal Exam: Soft, Normal Bowel Sounds.  absent: Tenderness





- Extremities Exam


Extremities Exam: Full ROM, Normal Capillary Refill, Normal Inspection.  absent

: Joint Swelling, Pedal Edema





- Back Exam


Back Exam: NORMAL INSPECTION





- Neurological Exam


Neurological Exam: Alert, Awake, CN II-XII Intact, Oriented x3





- Psychiatric Exam


Psychiatric exam: Normal Affect, Normal Mood





- Skin


Skin Exam: Dry, Intact, Normal Color, Warm





Assessment and Plan


(1) CAD (coronary artery disease) of artery bypass graft


Status: Acute   





(2) Chest pain


Status: Acute   





(3) Acute bronchitis with chronic obstructive pulmonary disease (COPD)


Status: Acute   





(4) Asthma exacerbation


Status: Acute   





(5) COPD (chronic obstructive pulmonary disease)


Status: Acute   





(6) Chest wall contusion


Status: Acute   





(7) Lower extremity edema


Status: Acute

## 2018-02-16 LAB
BUN SERPL-MCNC: 16 MG/DL (ref 7–17)
CALCIUM SERPL-MCNC: 9.1 MG/DL (ref 8.4–10.2)
ERYTHROCYTE [DISTWIDTH] IN BLOOD BY AUTOMATED COUNT: 12.4 % (ref 11.5–14.5)
GFR NON-AFRICAN AMERICAN: > 60
HGB BLD-MCNC: 12.5 G/DL (ref 12–16)
MCH RBC QN AUTO: 27.2 PG (ref 27–31)
MCHC RBC AUTO-ENTMCNC: 32.2 G/DL (ref 33–37)
MCV RBC AUTO: 84.5 FL (ref 81–99)
PLATELET # BLD: 156 K/UL (ref 130–400)
RBC # BLD AUTO: 4.6 MIL/UL (ref 3.8–5.2)
WBC # BLD AUTO: 2.4 K/UL (ref 4.8–10.8)

## 2018-02-16 RX ADMIN — PROMETHAZINE HYDROCHLORIDE AND DEXTROMETHORPHAN HYDROBROMIDE PRN ML: 15; 6.25 SYRUP ORAL at 17:11

## 2018-02-16 RX ADMIN — Medication SCH MG: at 17:10

## 2018-02-16 RX ADMIN — CIPROFLOXACIN SCH MLS/HR: 2 INJECTION, SOLUTION INTRAVENOUS at 20:45

## 2018-02-16 RX ADMIN — ENOXAPARIN SODIUM SCH MG: 40 INJECTION SUBCUTANEOUS at 09:07

## 2018-02-16 RX ADMIN — PURIFIED WATER PRN LOZ: 99.05 LIQUID OPHTHALMIC at 09:06

## 2018-02-16 RX ADMIN — Medication SCH INTLU: at 09:09

## 2018-02-16 RX ADMIN — PROMETHAZINE HYDROCHLORIDE AND DEXTROMETHORPHAN HYDROBROMIDE PRN ML: 15; 6.25 SYRUP ORAL at 09:08

## 2018-02-16 RX ADMIN — Medication SCH MG: at 09:06

## 2018-02-16 RX ADMIN — CIPROFLOXACIN SCH MLS/HR: 2 INJECTION, SOLUTION INTRAVENOUS at 09:05

## 2018-02-16 RX ADMIN — METOPROLOL SUCCINATE SCH MG: 50 TABLET, EXTENDED RELEASE ORAL at 21:31

## 2018-02-16 NOTE — CP.PCM.PN
<Jeremy Sheikh - Last Filed: 02/16/18 13:32>





Subjective





- Date & Time of Evaluation


Date of Evaluation: 02/16/18


Time of Evaluation: 08:00





- Subjective


Subjective: 





Patient evaluated at bedside with Dr Burnette. CP resolved. Still c/o persistent 

SOB on exertion and cough. No event overnight.





Objective





- Vital Signs/Intake and Output


Vital Signs (last 24 hours): 


 











Temp Pulse Resp BP Pulse Ox


 


 98.1 F   70   20   103/59 L  94 L


 


 02/16/18 08:00  02/16/18 08:00  02/16/18 08:00  02/16/18 08:00  02/16/18 08:00








Intake and Output: 


 











 02/16/18 02/16/18





 06:59 18:59


 


Intake Total 1200 


 


Balance 1200 














- Medications


Medications: 


 Current Medications





Acetaminophen (Tylenol 325mg Tab)  650 mg PO Q6 PRN


   PRN Reason: Fever >100.4 F


   Last Admin: 02/13/18 17:35 Dose:  650 mg


Acetaminophen (Tylenol 325mg Tab)  650 mg PO Q6 PRN


   PRN Reason: Headache


   Last Admin: 02/13/18 00:24 Dose:  650 mg


Albuterol/Ipratropium (Duoneb 3 Mg/0.5 Mg (3 Ml) Ud)  3 ml INH RQ4 PRN


   PRN Reason: Shortness of Breath


   Last Admin: 02/14/18 20:27 Dose:  3 ml


Alprazolam (Xanax)  1 mg PO BID PRN


   PRN Reason: Anxiety


   Last Admin: 02/15/18 21:15 Dose:  1 mg


Aspirin (Ecotrin)  81 mg PO DAILY [facility]


   Last Admin: 02/16/18 09:06 Dose:  81 mg


Atorvastatin Calcium (Lipitor)  20 mg PO HS [facility]


   Last Admin: 02/15/18 21:11 Dose:  20 mg


Benzocaine/Menthol (Cepacol Sore Throat)  1 ruben PO Q2 PRN


   PRN Reason: Sore Throat


   Last Admin: 02/16/18 09:06 Dose:  1 ruben


Enoxaparin Sodium (Lovenox)  40 mg SC DAILY [facility]


   PRN Reason: Protocol


   Last Admin: 02/16/18 09:07 Dose:  40 mg


Azithromycin 500 mg/ Sodium (Chloride)  250 mls @ 250 mls/hr IVPB DAILY [facility]


   PRN Reason: Protocol


   Last Admin: 02/15/18 09:19 Dose:  250 mls/hr


Ciprofloxacin (Cipro 400mg/200ml Dsw)  400 mg in 200 mls @ 200 mls/hr IVPB Q12 

[facility]


   PRN Reason: Protocol


   Last Admin: 02/16/18 09:05 Dose:  200 mls/hr


Ibuprofen (Motrin Tab)  400 mg PO Q6 PRN


   PRN Reason: Pain, moderate (4-7)


Metoprolol Succinate (Toprol Xl)  50 mg PO HS [facility]


   Last Admin: 02/15/18 21:11 Dose:  50 mg


Oseltamivir Phosphate (Tamiflu Cap)  75 mg PO BID [facility]


   PRN Reason: Protocol


   Last Admin: 02/16/18 09:08 Dose:  75 mg


Promethazine HCl/Dextromethorphan (Phenergan Dm Syrup)  5 ml PO Q6 PRN


   PRN Reason: Cough


   Last Admin: 02/16/18 09:08 Dose:  5 ml


Saccharomyces Boulardii (Florastor)  250 mg PO BID [facility]


   Last Admin: 02/16/18 09:06 Dose:  250 mg


Vitamin D (Vitamin D 400 Intl Units Tab)  400 intlu PO DAILY [facility]


   Last Admin: 02/16/18 09:09 Dose:  400 intlu











- Labs


Labs: 


 





 02/16/18 04:57 





 02/16/18 05:04 





 











PT  27.1 Seconds (9.8-13.1)  H  02/12/18  14:34    


 


INR  1.0  (0.9-1.2)   02/12/18  14:34    


 


APTT  11.5 Seconds (25.6-37.1)  L  02/12/18  14:34    














- Constitutional


Appears: Non-toxic, In Acute Distress (SOB, cough)





- Eye Exam


Eye Exam: EOMI, PERRL





- ENT Exam


ENT Exam: Mucous Membranes Moist





- Respiratory Exam


Respiratory Exam: Rhonchi, NORMAL BREATHING PATTERN.  absent: Rales, Wheezes





- Cardiovascular Exam


Cardiovascular Exam: REGULAR RHYTHM, +S1, +S2, Murmur





- GI/Abdominal Exam


GI & Abdominal Exam: Soft.  absent: Tenderness





- Extremities Exam


Extremities Exam: Normal Capillary Refill.  absent: Tenderness





- Neurological Exam


Neurological Exam: Alert, Awake, Oriented x3





- Psychiatric Exam


Psychiatric exam: Normal Affect, Normal Mood





- Skin


Skin Exam: Normal Color, Warm





Assessment and Plan





- Assessment and Plan (Free Text)


Assessment: 





1) COPD exacerbation


- Stable, Still c/o persistent cough and SOB on exertion


- Duonebs q4


- Acapella treatment BID





2) Influenza


-acute


- Flu positive


- C/W tamiflu





3) Pharyngitis


-acute


-Improved


- C/W Azithromycin





4)Chest pain 


- resolved. ACS ruled out


- troponin x 3 negative


- EKG: no acute findings


- Echo perserved EF, left atrium is dilated, mild MR





5) DVT prophylaxis


- Lovenox








<Earl Burnetteak K - Last Filed: 02/16/18 16:51>





Objective





- Vital Signs/Intake and Output


Vital Signs (last 24 hours): 


 











Temp Pulse Resp BP Pulse Ox


 


 98.2 F   82   20   129/71   93 L


 


 02/16/18 16:02  02/16/18 16:02  02/16/18 16:02  02/16/18 16:02  02/16/18 16:02








Intake and Output: 


 











 02/16/18 02/16/18





 06:59 18:59


 


Intake Total 1200 


 


Balance 1200 














- Medications


Medications: 


 Current Medications





Acetaminophen (Tylenol 325mg Tab)  650 mg PO Q6 PRN


   PRN Reason: Fever >100.4 F


   Last Admin: 02/13/18 17:35 Dose:  650 mg


Acetaminophen (Tylenol 325mg Tab)  650 mg PO Q6 PRN


   PRN Reason: Headache


   Last Admin: 02/13/18 00:24 Dose:  650 mg


Albuterol/Ipratropium (Duoneb 3 Mg/0.5 Mg (3 Ml) Ud)  3 ml INH RQ4 PRN


   PRN Reason: Shortness of Breath


   Last Admin: 02/14/18 20:27 Dose:  3 ml


Alprazolam (Xanax)  1 mg PO BID PRN


   PRN Reason: Anxiety


   Last Admin: 02/15/18 21:15 Dose:  1 mg


Aspirin (Ecotrin)  81 mg PO DAILY [facility]


   Last Admin: 02/16/18 09:06 Dose:  81 mg


Atorvastatin Calcium (Lipitor)  20 mg PO HS [facility]


   Last Admin: 02/15/18 21:11 Dose:  20 mg


Benzocaine/Menthol (Cepacol Sore Throat)  1 ruben PO Q2 PRN


   PRN Reason: Sore Throat


   Last Admin: 02/16/18 09:06 Dose:  1 ruben


Enoxaparin Sodium (Lovenox)  40 mg SC DAILY [facility]


   PRN Reason: Protocol


   Last Admin: 02/16/18 09:07 Dose:  40 mg


Azithromycin 500 mg/ Sodium (Chloride)  250 mls @ 250 mls/hr IVPB DAILY [facility]


   PRN Reason: Protocol


   Last Admin: 02/16/18 11:48 Dose:  250 mls/hr


Ciprofloxacin (Cipro 400mg/200ml Dsw)  400 mg in 200 mls @ 200 mls/hr IVPB Q12 

[facility]


   PRN Reason: Protocol


   Last Admin: 02/16/18 09:05 Dose:  200 mls/hr


Ibuprofen (Motrin Tab)  400 mg PO Q6 PRN


   PRN Reason: Pain, moderate (4-7)


Metoprolol Succinate (Toprol Xl)  50 mg PO HS [facility]


   Last Admin: 02/15/18 21:11 Dose:  50 mg


Oseltamivir Phosphate (Tamiflu Cap)  75 mg PO BID [facility]


   PRN Reason: Protocol


   Last Admin: 02/16/18 09:08 Dose:  75 mg


Promethazine HCl/Dextromethorphan (Phenergan Dm Syrup)  5 ml PO Q6 PRN


   PRN Reason: Cough


   Last Admin: 02/16/18 09:08 Dose:  5 ml


Saccharomyces Boulardii (Florastor)  250 mg PO BID [facility]


   Last Admin: 02/16/18 09:06 Dose:  250 mg


Vitamin D (Vitamin D 400 Intl Units Tab)  400 intlu PO DAILY [facility]


   Last Admin: 02/16/18 09:09 Dose:  400 intlu











- Labs


Labs: 


 





 02/16/18 04:57 





 02/16/18 05:04 





 











PT  27.1 Seconds (9.8-13.1)  H  02/12/18  14:34    


 


INR  1.0  (0.9-1.2)   02/12/18  14:34    


 


APTT  11.5 Seconds (25.6-37.1)  L  02/12/18  14:34    














Assessment and Plan





- Assessment and Plan (Free Text)


Assessment: 


Patient was personally seen and examined by me in rounds with residents.


Available labs and diagnostic data reviewed.


Case, Patient's condition and management plan discussed with residents in 

rounds. 


Agree with resident's documentation.


Plan: As ordered.





Severo Burnette MD

## 2018-02-17 RX ADMIN — PROMETHAZINE HYDROCHLORIDE AND DEXTROMETHORPHAN HYDROBROMIDE PRN ML: 15; 6.25 SYRUP ORAL at 23:09

## 2018-02-17 RX ADMIN — CIPROFLOXACIN SCH MLS/HR: 2 INJECTION, SOLUTION INTRAVENOUS at 09:41

## 2018-02-17 RX ADMIN — CIPROFLOXACIN SCH MLS/HR: 2 INJECTION, SOLUTION INTRAVENOUS at 21:23

## 2018-02-17 RX ADMIN — PROMETHAZINE HYDROCHLORIDE AND DEXTROMETHORPHAN HYDROBROMIDE PRN ML: 15; 6.25 SYRUP ORAL at 04:37

## 2018-02-17 RX ADMIN — Medication SCH INTLU: at 09:45

## 2018-02-17 RX ADMIN — Medication SCH MG: at 09:42

## 2018-02-17 RX ADMIN — PROMETHAZINE HYDROCHLORIDE AND DEXTROMETHORPHAN HYDROBROMIDE PRN ML: 15; 6.25 SYRUP ORAL at 16:59

## 2018-02-17 RX ADMIN — PROMETHAZINE HYDROCHLORIDE AND DEXTROMETHORPHAN HYDROBROMIDE PRN ML: 15; 6.25 SYRUP ORAL at 09:44

## 2018-02-17 RX ADMIN — Medication SCH MG: at 16:58

## 2018-02-17 RX ADMIN — METOPROLOL SUCCINATE SCH MG: 50 TABLET, EXTENDED RELEASE ORAL at 21:22

## 2018-02-17 RX ADMIN — ENOXAPARIN SODIUM SCH MG: 40 INJECTION SUBCUTANEOUS at 09:42

## 2018-02-17 NOTE — PN
DATE:  02/17/2018



SUBJECTIVE:  The patient is seen and examined.  Interim events noted.  The

patient still complains of coughing _____, but she is coughing a lot.  No

chest pain.  No shortness of breath.



PHYSICAL EXAMINATION:

GENERAL:  The patient is in no acute distress.

VITAL SIGNS:  Stable.

HEART:  S1 and S2, normal and regular.

LUNGS:  Good bilateral air exchange.

ABDOMEN:  Soft and nontender.

EXTREMITIES:  No edema.  No calf swelling.  No tenderness.  No acute

ischemia.

CENTRAL NERVOUS SYSTEM:  Essentially unchanged.



DIAGNOSTIC DATA:  Available diagnostic data reviewed.  Telemetry monitoring

does not reveal significant arrhythmia.



PLAN:  Overall, the patient's general medical condition is stable, slowly

improving.  Plan as ordered.





__________________________________________

Severo Burnette MD





DD:  02/17/2018 7:18:06

DT:  02/17/2018 7:48:34

Job # [id number]

## 2018-02-18 VITALS
SYSTOLIC BLOOD PRESSURE: 149 MMHG | OXYGEN SATURATION: 98 % | HEART RATE: 72 BPM | TEMPERATURE: 97.4 F | DIASTOLIC BLOOD PRESSURE: 80 MMHG

## 2018-02-18 VITALS — RESPIRATION RATE: 18 BRPM

## 2018-02-18 RX ADMIN — PROMETHAZINE HYDROCHLORIDE AND DEXTROMETHORPHAN HYDROBROMIDE PRN ML: 15; 6.25 SYRUP ORAL at 09:00

## 2018-02-18 RX ADMIN — Medication SCH MG: at 08:59

## 2018-02-18 RX ADMIN — Medication SCH INTLU: at 09:01

## 2018-02-18 RX ADMIN — ENOXAPARIN SODIUM SCH MG: 40 INJECTION SUBCUTANEOUS at 08:59

## 2018-02-18 NOTE — PN
DATE:  02/18/2018



SUBJECTIVE:  The patient is seen and examined.  Interim events noted.  The

patient remains in Progressive Care Unit on telemetry monitoring, feels

much better.  She is coughing, but shortness of breath and chest pain is

resolved.



PHYSICAL EXAMINATION:

GENERAL:  The patient is in no acute distress.

VITAL SIGNS:  Stable.

HEART:  S1 and S2, normal and regular.

LUNGS:  Good bilateral air exchange.

ABDOMEN:  Soft and nontender.

EXTREMITIES:  No edema.  No calf swelling.  No tenderness.  No acute

ischemia.

CENTRAL NERVOUS SYSTEM:  Essentially unchanged.



DIAGNOSTIC DATA:  Available diagnostic data reviewed.



PLAN:  The patient is clinically stable.  We will discharge the patient

home today.  To follow up as outpatient.  Case and plan discussed with the

patient.





__________________________________________

Severo Burnette MD





DD:  02/18/2018 7:28:33

DT:  02/18/2018 8:56:05

Job # [id number]

## 2019-03-22 ENCOUNTER — HOSPITAL ENCOUNTER (OUTPATIENT)
Dept: HOSPITAL 31 - C.CARD | Age: 74
End: 2019-03-22
Payer: MEDICARE